# Patient Record
Sex: MALE | Race: WHITE | NOT HISPANIC OR LATINO | Employment: UNEMPLOYED | URBAN - METROPOLITAN AREA
[De-identification: names, ages, dates, MRNs, and addresses within clinical notes are randomized per-mention and may not be internally consistent; named-entity substitution may affect disease eponyms.]

---

## 2018-01-18 ENCOUNTER — HOSPITAL ENCOUNTER (EMERGENCY)
Facility: CLINIC | Age: 28
Discharge: HOME | End: 2018-01-18
Attending: NURSE PRACTITIONER | Admitting: NURSE PRACTITIONER
Payer: COMMERCIAL

## 2018-01-18 VITALS
DIASTOLIC BLOOD PRESSURE: 75 MMHG | HEART RATE: 60 BPM | SYSTOLIC BLOOD PRESSURE: 131 MMHG | TEMPERATURE: 98 F | OXYGEN SATURATION: 98 % | RESPIRATION RATE: 16 BRPM

## 2018-01-18 DIAGNOSIS — L20.9 ATOPIC DERMATITIS, UNSPECIFIED TYPE: ICD-10-CM

## 2018-01-18 DIAGNOSIS — L03.113 CELLULITIS OF RIGHT UPPER EXTREMITY: ICD-10-CM

## 2018-01-18 PROCEDURE — 99213 OFFICE O/P EST LOW 20 MIN: CPT | Performed by: NURSE PRACTITIONER

## 2018-01-18 PROCEDURE — G0463 HOSPITAL OUTPT CLINIC VISIT: HCPCS

## 2018-01-18 RX ORDER — BETAMETHASONE DIPROPIONATE 0.5 MG/G
OINTMENT, AUGMENTED TOPICAL
Qty: 45 G | Refills: 0 | Status: SHIPPED | OUTPATIENT
Start: 2018-01-18 | End: 2021-12-13

## 2018-01-18 RX ORDER — CEPHALEXIN 500 MG/1
500 CAPSULE ORAL 3 TIMES DAILY
Qty: 30 CAPSULE | Refills: 0 | Status: SHIPPED | OUTPATIENT
Start: 2018-01-18 | End: 2018-01-28

## 2018-01-18 ASSESSMENT — ENCOUNTER SYMPTOMS
VOMITING: 0
DIFFICULTY URINATING: 0
CONSTIPATION: 0
SHORTNESS OF BREATH: 0
FEVER: 0
ACTIVITY CHANGE: 0
ABDOMINAL PAIN: 0
SORE THROAT: 0
EYE PAIN: 0
CHILLS: 0
COUGH: 0
APPETITE CHANGE: 0
NAUSEA: 0
DIARRHEA: 0
DIAPHORESIS: 0

## 2018-01-18 NOTE — ED AVS SNAPSHOT
[facility] Emergency Department    [address] [address]    Phone:  [phone]    Fax:  [phone]                                       Berny Fowler   MRN: [id number]    Department:  [facility] Emergency Department   Date of Visit:  1/18/2018           After Visit Summary Signature Page     I have received my discharge instructions, and my questions have been answered. I have discussed any challenges I see with this plan with the nurse or doctor.    ..........................................................................................................................................  Patient/Patient Representative Signature      ..........................................................................................................................................  Patient Representative Print Name and Relationship to Patient    ..................................................               ................................................  Date                                            Time    ..........................................................................................................................................  Reviewed by Signature/Title    ...................................................              ..............................................  Date                                                            Time

## 2018-01-18 NOTE — ED AVS SNAPSHOT
[facility] Emergency Department    [address] [facility] [address]    Phone:  [phone]    Fax:  [phone]                                       Berny Fowler   MRN: [id number]    Department:  [facility] Emergency Department   Date of Visit:  1/18/2018           Patient Information     Date Of Birth          1990        Your diagnoses for this visit were:     Cellulitis of right upper extremity     Atopic dermatitis, unspecified type        You were seen by Mónica Black APRN CNP.      Follow-up Information     Follow up with Clinic, [facility] In 4 days.    Why:  For wound re-check    Contact information:    [address] [facility] [facility] [address] [phone]          Discharge Instructions           Discharge Instructions for Cellulitis  You have been diagnosed with cellulitis. This is an infection in the deepest layer of the skin. In some cases, the infection also affects the muscle. Cellulitis is caused by bacteria. The bacteria can enter the body through broken skin. This can happen with a cut, scratch, animal bite, or an insect bite that has been scratched. You may have been treated in the hospital with antibiotics and fluids. You will likely be given a prescription for antibiotics to take at home. This sheet will help you take care of yourself at home.  Home care  When you are home:    Take the prescribed antibiotic medicine you are given as directed until it is gone. Take it even if you feel better. It treats the infection and stops it from returning. Not taking all the medicine can make future infections hard to treat.    Keep the infected area clean.    When possible, raise the infected area above the level of your heart. This helps keep swelling down.    Talk with your healthcare provider if you are in pain. Ask what kind of over-the-counter medicine you can take for pain.    Apply clean bandages as advised.    Take your temperature once a day for a  week.    Wash your hands often to prevent spreading the infection.  In the future, wash your hands before and after you touch cuts, scratches, or bandages. This will help prevent infection.   When to call your healthcare provider  Call your healthcare provider immediately if you have any of the following:    Difficulty or pain when moving the joints above or below the infected area    Discharge or pus draining from the area    Fever of 100.4 F (38 C) or higher, or as directed by your healthcare provider    Pain that gets worse in or around the infected     Redness that gets worse in or around the infected area, particularly if the area of redness expands to a wider area    Shaking chills    Swelling of the infected area    Vomiting   Date Last Reviewed: 8/1/2016 2000-2017 [employer]. [address] [place] [address], [place] [address]. All rights reserved. This information is not intended as a substitute for professional medical care. Always follow your healthcare professional's instructions.        Atopic Dermatitis (Adult)  Atopic dermatitis is a dry, itchy, red rash. It s also called eczema. The rash is chronic, or ongoing. It can come and go over time. The disease is often passed down in families. It causes a problem with the skin barrier that makes the skin more sensitive to the environment and other factors. The increased skin sensitivity causes an itch, which causes scratching. Scratching can worsen the itching or also break the skin. This can put the skin at risk of infection.  The condition is most common in people with asthma, hay fever, hives, or dry or sensitive skin. The rash may be caused by extreme heat or heavy sweating. Skin irritants can cause the rash to flare up. These can include wool or silk clothing, grease, oils, some medicines, and harsh soaps and detergents. Emotional stress can also be a trigger.  Treatment is done to relieve the itching and inflammation of the skin.  Home  care  Follow these tips to care for your condition:    Keep the areas of rash clean by bathing at least every other day. Use lukewarm water to bathe. Don t use hot water, which can dry out the skin.    Don t use soaps with strong detergents. Use mild soaps made for sensitive skin.    Apply a cream or ointment to damp skin right after bathing.    Avoid things that irritate your skin. Wear absorbent, soft fabrics next to the skin rather than rough or scratchy materials.    Use mild laundry soap free of scents and perfumes. Make sure to rinse all the soap out of your clothes.    Treat any skin infection as directed.    Use oral diphenhydramine to help reduce itching. This is an antihistamine you can buy at drug and grocery stores. It can make you sleepy, so use lower doses during the daytime. Or you can use loratadine. This is an antihistamine that will not make you sleepy. Do not use diphenhydramine if you have glaucoma or have trouble urinating due to an enlarged prostate.  Follow-up care  See your healthcare provider, or as advised. If your symptoms don t get better or if they get worse in the next 7 days, make an appointment with your healthcare provider.  When to seek medical advice  Call your healthcare provider right away  if any of these occur:    Increasing area of redness or pain in the skin    Yellow crusts or wet drainage from the rash    Fever of 100.4 F (38 C) or higher, or as directed by your healthcare provider  Date Last Reviewed: 9/1/2016 2000-2017 [employer]. [address] [place] [address]. All rights reserved. This information is not intended as a substitute for professional medical care. Always follow your healthcare professional's instructions.          24 Hour Appointment Hotline       To make an appointment at any [facility], call [phone] ([phone]). If you don't have a family doctor or clinic, we will help you find one. [facility] are  conveniently located to serve the needs of you and your family.             Review of your medicines      START taking        Dose / Directions Last dose taken    augmented betamethasone dipropionate 0.05 % ointment   Commonly known as:  DIPROLENE-AF   Quantity:  45 g        Apply sparingly to affected area twice daily as needed.  Do not apply to face.   Refills:  0        cephALEXin 500 MG capsule   Commonly known as:  KEFLEX   Dose:  500 mg   Quantity:  30 capsule        Take 1 capsule (500 mg) by mouth 3 times daily for 10 days   Refills:  0                Prescriptions were sent or printed at these locations (2 Prescriptions)                   [facility] [place], [place] - [address] [facility]   [address] [facility] [address]    Telephone:  [phone]   Fax:  [phone]   Hours:                  E-Prescribed (2 of 2)         augmented betamethasone dipropionate (DIPROLENE-AF) 0.05 % ointment               cephALEXin (KEFLEX) 500 MG capsule                Orders Needing Specimen Collection     None      Pending Results     No orders found from 1/16/2018 to 1/19/2018.            Pending Culture Results     No orders found from 1/16/2018 to 1/19/2018.            Pending Results Instructions     If you had any lab results that were not finalized at the time of your Discharge, you can call the ED Lab Result RN at [phone]. You will be contacted by this team for any positive Lab results or changes in treatment. The nurses are available 7 days a week from 10A to 6:30P.  You can leave a message 24 hours per day and they will return your call.        Test Results From Your Hospital Stay               Thank you for choosing [facility]       Thank you for choosing [facility] for your care. Our goal is always to provide you with excellent care. Hearing back from our patients is one way we can continue to improve our services. Please take a few minutes to complete the written survey that you may receive  "in the mail after you visit with us. Thank you!        [employer] Information     [employer] lets you send messages to your doctor, view your test results, renew your prescriptions, schedule appointments and more. To sign up, go to [email or web address] . Click on \"Log in\" on the left side of the screen, which will take you to the Welcome page. Then click on \"Sign up Now\" on the right side of the page.     You will be asked to enter the access code listed below, as well as some personal information. Please follow the directions to create your username and password.     Your access code is: [email or web address]  Expires: 2018  6:21 PM     Your access code will  in 90 days. If you need help or a new code, please call your [facility] clinic or [phone].        Care EveryWhere ID     This is your Care EveryWhere ID. This could be used by other organizations to access your [facility] medical records  [id number]        Equal Access to Services     [facility] : Hadii eliud chingo Sotrisha, waaxda luqadaha, qaybta kaalmada adeegyaashley, sagrario franco . So [facility] [phone].    ATENCIÓN: Si habla español, tiene a mccabe disposición servicios gratuitos de asistencia lingüística. Llame al [phone].    We comply with applicable federal civil rights laws and [place] laws. We do not discriminate on the basis of race, color, national origin, age, disability, sex, sexual orientation, or gender identity.            After Visit Summary       This is your record. Keep this with you and show to your community pharmacist(s) and doctor(s) at your next visit.                  "

## 2018-01-18 NOTE — ED NOTES
Patient here for rash on the right hand - since October - worsening the past 12 days.  Patient presents ambulatory to the urgent care.

## 2018-01-18 NOTE — ED PROVIDER NOTES
History     Chief Complaint   Patient presents with     Rash     on R hand     HPI  Berny Fowler is a 27 year old male who states started in October and over the past two weeks has been getting markedly worse.  It started as quarter size on top of hand near third knuckle.  Pt states it was a dry red area that itched and burned when washing hands.  Pt states over the past two weeks it has also started oozing and spread all over the top of the hand.  Tried mometasone cream once daily occasionally and this did seem to help.      Problem List:    Patient Active Problem List    Diagnosis Date Noted     Impacted cerumen 09/26/2012     Priority: Medium     Scabies 09/26/2012     Priority: Medium     Acne 08/04/2011     Priority: Medium     Flat feet 08/04/2011     Priority: Medium     CARDIOVASCULAR SCREENING; LDL GOAL LESS THAN 160 09/26/2012     Priority: Low        Past Medical History:    Past Medical History:   Diagnosis Date     Undescended testis        Past Surgical History:    Past Surgical History:   Procedure Laterality Date     NO HISTORY OF SURGERY         Family History:    Family History   Problem Relation Age of Onset     C.A.D. Father      MI at age 42 with stent placement     Hypertension Father      Lipids Father      C.A.D. Maternal Grandfather      C.A.D. Paternal Grandfather      Allergies Sister      Melanoma No family hx of        Social History:  Marital Status:  Single [1]  Social History   Substance Use Topics     Smoking status: Never Smoker     Smokeless tobacco: Never Used     Alcohol use No      Medications:      augmented betamethasone dipropionate (DIPROLENE-AF) 0.05 % ointment   cephALEXin (KEFLEX) 500 MG capsule         Review of Systems   Constitutional: Negative for activity change, appetite change, chills, diaphoresis and fever.   HENT: Negative for ear pain and sore throat.    Eyes: Negative for pain.   Respiratory: Negative for cough and shortness of breath.    Cardiovascular:  Negative for chest pain.   Gastrointestinal: Negative for abdominal pain, constipation, diarrhea, nausea and vomiting.   Genitourinary: Negative for difficulty urinating.   Skin: Positive for rash.   All other systems reviewed and are negative.      Physical Exam   BP: 131/75  Pulse: 60  Temp: 98  F (36.7  C)  Resp: 16  SpO2: 98 %      Physical Exam   Constitutional: He appears well-developed and well-nourished. No distress.   Eyes: Conjunctivae are normal. Right eye exhibits no discharge. Left eye exhibits no discharge.   Cardiovascular: Normal rate, regular rhythm and normal heart sounds.  Exam reveals no gallop and no friction rub.    No murmur heard.  Pulmonary/Chest: Effort normal and breath sounds normal. No respiratory distress. He has no wheezes. He has no rales. He exhibits no tenderness.   Neurological: He is alert.   Skin: Skin is warm. Lesion and rash (noted over dorsum of right hand in a scattered pattern.  The lesions vary in size from 3 mm to 10 mm and are raised macular with surrounding erythema, scales, and crusty drainage noted in some) noted. He is not diaphoretic.   Psychiatric: He has a normal mood and affect.   Nursing note and vitals reviewed.      ED Course     ED Course     Procedures      Labs Ordered and Resulted from Time of ED Arrival Up to the Time of Departure from the ED - No data to display    Assessments & Plan (with Medical Decision Making)     I have reviewed the nursing notes.    I have reviewed the findings, diagnosis, plan and need for follow up with the patient.  Berny Fowler is a 27 year old male who states started in October and over the past two weeks has been getting markedly worse.  It started as quarter size on top of hand near third knuckle.  Pt states it was a dry red area that itched and burned when washing hands.  Pt states over the past two weeks it has also started oozing and spread all over the top of the hand.  Tried mometasone cream once daily occasionally  and this did seem to help.  DDx: contact or allergic dermatitis, drug reaction, viral exanthem, insect bite, cellulitis, atopic dermatitis, psoriasis, infected atopic dermatitis.  Exam as noted above.  Based upon mild responsiveness to occasional mometasone and based upon appearance it is likely the patient started out as having an atopic dermatitis and based upon the erythema it appears that there may be any emerging cellulitis noted.  Will treat with cephalexin and augmented betamethasone.  Strongly advised patient on usage of both and need for follow-up.  Patient verbalizes understanding and denies any questions.    Discharge Medication List as of 1/18/2018  6:22 PM      START taking these medications    Details   augmented betamethasone dipropionate (DIPROLENE-AF) 0.05 % ointment Apply sparingly to affected area twice daily as needed.  Do not apply to face.Disp-45 g, [id number]      cephALEXin (KEFLEX) 500 MG capsule Take 1 capsule (500 mg) by mouth 3 times daily for 10 days, Disp-30 capsule, R-0, E-Prescribe             Final diagnoses:   Cellulitis of right upper extremity   Atopic dermatitis, unspecified type       1/18/2018   [facility] EMERGENCY DEPARTMENT     Mónica Black, KALINA CNP  01/18/18 0656

## 2018-01-19 NOTE — DISCHARGE INSTRUCTIONS
Discharge Instructions for Cellulitis  You have been diagnosed with cellulitis. This is an infection in the deepest layer of the skin. In some cases, the infection also affects the muscle. Cellulitis is caused by bacteria. The bacteria can enter the body through broken skin. This can happen with a cut, scratch, animal bite, or an insect bite that has been scratched. You may have been treated in the hospital with antibiotics and fluids. You will likely be given a prescription for antibiotics to take at home. This sheet will help you take care of yourself at home.  Home care  When you are home:    Take the prescribed antibiotic medicine you are given as directed until it is gone. Take it even if you feel better. It treats the infection and stops it from returning. Not taking all the medicine can make future infections hard to treat.    Keep the infected area clean.    When possible, raise the infected area above the level of your heart. This helps keep swelling down.    Talk with your healthcare provider if you are in pain. Ask what kind of over-the-counter medicine you can take for pain.    Apply clean bandages as advised.    Take your temperature once a day for a week.    Wash your hands often to prevent spreading the infection.  In the future, wash your hands before and after you touch cuts, scratches, or bandages. This will help prevent infection.   When to call your healthcare provider  Call your healthcare provider immediately if you have any of the following:    Difficulty or pain when moving the joints above or below the infected area    Discharge or pus draining from the area    Fever of 100.4 F (38 C) or higher, or as directed by your healthcare provider    Pain that gets worse in or around the infected     Redness that gets worse in or around the infected area, particularly if the area of redness expands to a wider area    Shaking chills    Swelling of the infected area    Vomiting   Date Last Reviewed:  8/1/2016 2000-2017 [employer]. [address] [place] [address], [place] [address]. All rights reserved. This information is not intended as a substitute for professional medical care. Always follow your healthcare professional's instructions.        Atopic Dermatitis (Adult)  Atopic dermatitis is a dry, itchy, red rash. It s also called eczema. The rash is chronic, or ongoing. It can come and go over time. The disease is often passed down in families. It causes a problem with the skin barrier that makes the skin more sensitive to the environment and other factors. The increased skin sensitivity causes an itch, which causes scratching. Scratching can worsen the itching or also break the skin. This can put the skin at risk of infection.  The condition is most common in people with asthma, hay fever, hives, or dry or sensitive skin. The rash may be caused by extreme heat or heavy sweating. Skin irritants can cause the rash to flare up. These can include wool or silk clothing, grease, oils, some medicines, and harsh soaps and detergents. Emotional stress can also be a trigger.  Treatment is done to relieve the itching and inflammation of the skin.  Home care  Follow these tips to care for your condition:    Keep the areas of rash clean by bathing at least every other day. Use lukewarm water to bathe. Don t use hot water, which can dry out the skin.    Don t use soaps with strong detergents. Use mild soaps made for sensitive skin.    Apply a cream or ointment to damp skin right after bathing.    Avoid things that irritate your skin. Wear absorbent, soft fabrics next to the skin rather than rough or scratchy materials.    Use mild laundry soap free of scents and perfumes. Make sure to rinse all the soap out of your clothes.    Treat any skin infection as directed.    Use oral diphenhydramine to help reduce itching. This is an antihistamine you can buy at drug and grocery stores. It can make you sleepy, so use  lower doses during the daytime. Or you can use loratadine. This is an antihistamine that will not make you sleepy. Do not use diphenhydramine if you have glaucoma or have trouble urinating due to an enlarged prostate.  Follow-up care  See your healthcare provider, or as advised. If your symptoms don t get better or if they get worse in the next 7 days, make an appointment with your healthcare provider.  When to seek medical advice  Call your healthcare provider right away  if any of these occur:    Increasing area of redness or pain in the skin    Yellow crusts or wet drainage from the rash    Fever of 100.4 F (38 C) or higher, or as directed by your healthcare provider  Date Last Reviewed: 9/1/2016 2000-2017 [employer]. [address] [place] [address], [place] [address]. All rights reserved. This information is not intended as a substitute for professional medical care. Always follow your healthcare professional's instructions.

## 2018-10-09 ENCOUNTER — OFFICE VISIT (OUTPATIENT)
Dept: DERMATOLOGY | Facility: CLINIC | Age: 28
End: 2018-10-09
Payer: COMMERCIAL

## 2018-10-09 VITALS — OXYGEN SATURATION: 99 % | DIASTOLIC BLOOD PRESSURE: 68 MMHG | SYSTOLIC BLOOD PRESSURE: 112 MMHG | HEART RATE: 67 BPM

## 2018-10-09 DIAGNOSIS — L20.81 ATOPIC NEURODERMATITIS: Primary | ICD-10-CM

## 2018-10-09 PROCEDURE — 99203 OFFICE O/P NEW LOW 30 MIN: CPT | Performed by: DERMATOLOGY

## 2018-10-09 RX ORDER — DESONIDE 0.5 MG/G
CREAM TOPICAL
Qty: 60 G | Refills: 1 | Status: SHIPPED | OUTPATIENT
Start: 2018-10-09 | End: 2021-12-13

## 2018-10-09 NOTE — MR AVS SNAPSHOT
After Visit Summary   10/9/2018    Berny Fowler    MRN: [id number]           Patient Information     Date Of Birth          1990        Visit Information        Provider Department      10/9/2018 1:30 PM Junito Carson MD [facility]        Today's Diagnoses     Atopic neurodermatitis    -  1      Care Instructions    **Start applying the prescription cream to affected areas.   **Start taking an over the counter Zyrtec at bedtime  **Follow up with  in 2 weeks    Proper skin care from Dr. Carson- [facility]     Eliminate harsh soaps, i.e. Dial, Zest, Shamika Spring;   Use mild soaps such as Cetaphil or Dove Sensitive Skin   Avoid hot or cold showers   After showering, lightly dry off.    Aggressive use of a moisturizer (including Vanicream, Cetaphil, Aquaphor or Cerave)   We recommend using a tub that needs to be scooped out, not a pump. This has more of an oil base. It will hold moisture in your skin much better than a water base moisturizer. The ones recommended are non- pore clogging.       If you have any questions call [phone] and follow the prompts to Dr. Carson's office.           Follow-ups after your visit        Who to contact     If you have questions or need follow up information about today's clinic visit or your schedule please contact [facility] directly at [phone].  Normal or non-critical lab and imaging results will be communicated to you by MyChart, letter or phone within 4 business days after the clinic has received the results. If you do not hear from us within 7 days, please contact the clinic through MyChart or phone. If you have a critical or abnormal lab result, we will notify you by phone as soon as possible.  Submit refill requests through [employer] or call your pharmacy and they will forward the refill request to us. Please allow 3 business days for your refill to be completed.          Additional  Information About Your Visit        Care EveryWhere ID     This is your Care EveryWhere ID. This could be used by other organizations to access your [place] medical records  [id number]        Your Vitals Were     Pulse Pulse Oximetry                67 99%           Blood Pressure from Last 3 Encounters:   10/09/18 112/68   01/18/18 131/75   06/02/15 118/85    Weight from Last 3 Encounters:   11/17/14 59 kg (130 lb)   08/25/14 59 kg (130 lb)   08/12/14 59 kg (130 lb)              Today, you had the following     No orders found for display         Today's Medication Changes          These changes are accurate as of 10/9/18  1:45 PM.  If you have any questions, ask your nurse or doctor.               Start taking these medicines.        Dose/Directions    desonide 0.05 % cream   Commonly known as:  DESOWEN   Used for:  Atopic neurodermatitis   Started by:  Junito Carson MD        Apply sparingly to affected area twice dailytimes daily as needed.   Quantity:  60 g   Refills:  1            Where to get your medicines      These medications were sent to [facility] [address] [place] [address]  [address] [place] [address]     Phone:  [phone]     desonide 0.05 % cream                Primary Care Provider Office Phone # Fax #    [facility] [phone] [address] [facility] [address]        Equal Access to Services     BÁRBARA REYES : Hadale chingo Sotrisha, waaxda luqadaha, qaybta kaalmada shakir, sagrario miller. So [facility] [phone].    ATENCIÓN: Si habla español, tiene a mccabe disposición servicios gratuitos de asistencia lingüística. Sage al [phone].    We comply with applicable federal civil rights laws and [place] laws. We do not discriminate on the basis of race, color, national origin, age, disability, sex, sexual orientation, or gender identity.            Thank you!     Thank you for  choosing [facility]  for your care. Our goal is always to provide you with excellent care. Hearing back from our patients is one way we can continue to improve our services. Please take a few minutes to complete the written survey that you may receive in the mail after your visit with us. Thank you!             Your Updated Medication List - Protect others around you: Learn how to safely use, store and throw away your medicines at www.disposemymeds.org.          This list is accurate as of 10/9/18  1:45 PM.  Always use your most recent med list.                   Brand Name Dispense Instructions for use Diagnosis    augmented betamethasone dipropionate 0.05 % ointment    DIPROLENE-AF    45 g    Apply sparingly to affected area twice daily as needed.  Do not apply to face.        desonide 0.05 % cream    DESOWEN    60 g    Apply sparingly to affected area twice dailytimes daily as needed.    Atopic neurodermatitis

## 2018-10-09 NOTE — PATIENT INSTRUCTIONS
**Start applying the prescription cream to affected areas.   **Start taking an over the counter Zyrtec at bedtime  **Follow up with  in 2 weeks    Proper skin care from Dr. Carson- [facility]     Eliminate harsh soaps, i.e. Dial, Zest, Shamika Spring;   Use mild soaps such as Cetaphil or Dove Sensitive Skin   Avoid hot or cold showers   After showering, lightly dry off.    Aggressive use of a moisturizer (including Vanicream, Cetaphil, Aquaphor or Cerave)   We recommend using a tub that needs to be scooped out, not a pump. This has more of an oil base. It will hold moisture in your skin much better than a water base moisturizer. The ones recommended are non- pore clogging.       If you have any questions call [phone] and follow the prompts to Dr. Carson's office.

## 2018-10-09 NOTE — NURSING NOTE
Chief Complaint   Patient presents with     Psoriasis       Vitals:    10/09/18 1335   BP: 112/68   Pulse: 67   SpO2: 99%     Wt Readings from Last 1 Encounters:   11/17/14 59 kg (130 lb)       Rachel Barrow LPN.................10/9/2018

## 2018-10-09 NOTE — PROGRESS NOTES
Berny Fowler is a 28 year old year old male patient here today for rash on face.   .  Patient states this has been present for 1 year.  Patient reports the following symptoms:  Itcihng/burn .  Patient reports the following previous treatments none.  Patient reports the following modifying factors none.  Associated symptoms: none.  Patient has no other skin complaints today.  Remainder of the HPI, Meds, PMH, Allergies, FH, and SH was reviewed in chart.      Past Medical History:   Diagnosis Date     Undescended testis     CAME DOWN WITHOUT SURGERY       Past Surgical History:   Procedure Laterality Date     NO HISTORY OF SURGERY          Family History   Problem Relation Age of Onset     C.A.D. Father      MI at age 42 with stent placement     Hypertension Father      Lipids Father      C.A.D. Maternal Grandfather      C.A.D. Paternal Grandfather      Allergies Sister      Melanoma No family hx of        Social History     Social History     Marital status: Single     Spouse name: N/A     Number of children: N/A     Years of education: N/A     Occupational History      [employer]     Social History Main Topics     Smoking status: Never Smoker     Smokeless tobacco: Never Used     Alcohol use No     Drug use: No     Sexual activity: No     Other Topics Concern     Parent/Sibling W/ Cabg, Mi Or Angioplasty Before 65f 55m? Yes     father Mi's before the age of 55     Social History Narrative       Outpatient Encounter Prescriptions as of 10/9/2018   Medication Sig Dispense Refill     augmented betamethasone dipropionate (DIPROLENE-AF) 0.05 % ointment Apply sparingly to affected area twice daily as needed.  Do not apply to face. 45 g 0     No facility-administered encounter medications on file as of 10/9/2018.              Review Of Systems  Skin: As above  Eyes: negative  Ears/Nose/Throat: negative  Respiratory: No shortness of breath, dyspnea on exertion, cough, or hemoptysis  Cardiovascular:  negative  Gastrointestinal: negative  Genitourinary: negative  Musculoskeletal: negative  Neurologic: negative  Psychiatric: negative  Hematologic/Lymphatic/Immunologic: negative  Endocrine: negative      O:   NAD, WDWN, Alert & Oriented, Mood & Affect wnl, Vitals stable   Here today alone   /68  Pulse 67  SpO2 99%   General appearance normal   Vitals stable   Alert, oriented and in no acute distress      Following lymph nodes palpated: Occipital, Cervical, Supraclavicular no lad   Red eczmeatous patches on face with fine white scale       No nail pitting    The remainder of expanded problem focused exam was unremarkable; the following areas were examined:  scalp/hair, conjunctiva/lids, face, neck, lips, chest, digits/nails, RUE, LUE.      Eyes: Conjunctivae/lids:Normal     ENT: Lips, buccal mucosa, tongue: normal    MSK:Normal    Cardiovascular: peripheral edema none    Pulm: Breathing Normal    Neuro/Psych: Orientation:Normal; Mood/Affect:Normal      A/P:  1. Eczema  Zyrtec bedtime  Desonide twice daily  Skin care regimen reviewed with patient: Eliminate harsh soaps, i.e. Dial, zest, irsih spring; Mild soaps such as Cetaphil or Dove sensitive skin, avoid hot or cold showers, aggressive use of emollients including vanicream, cetaphil or cerave discussed with patient.    Return to clinic 2 weeks

## 2018-10-09 NOTE — LETTER
10/9/2018         RE: Berny Fowler  [address]  [place] [address]        Dear Colleague,    Thank you for referring your patient, Berny Fowler, to the [facility]. Please see a copy of my visit note below.    Breny Folwer is a 28 year old year old male patient here today for rash on face.   .  Patient states this has been present for 1 year.  Patient reports the following symptoms:  Itcihng/burn .  Patient reports the following previous treatments none.  Patient reports the following modifying factors none.  Associated symptoms: none.  Patient has no other skin complaints today.  Remainder of the HPI, Meds, PMH, Allergies, FH, and SH was reviewed in chart.      Past Medical History:   Diagnosis Date     Undescended testis     CAME DOWN WITHOUT SURGERY       Past Surgical History:   Procedure Laterality Date     NO HISTORY OF SURGERY          Family History   Problem Relation Age of Onset     C.A.D. Father      MI at age 42 with stent placement     Hypertension Father      Lipids Father      C.A.D. Maternal Grandfather      C.A.D. Paternal Grandfather      Allergies Sister      Melanoma No family hx of        Social History     Social History     Marital status: Single     Spouse name: N/A     Number of children: N/A     Years of education: N/A     Occupational History      [employer]     Social History Main Topics     Smoking status: Never Smoker     Smokeless tobacco: Never Used     Alcohol use No     Drug use: No     Sexual activity: No     Other Topics Concern     Parent/Sibling W/ Cabg, Mi Or Angioplasty Before 65f 55m? Yes     father Mi's before the age of 55     Social History Narrative       Outpatient Encounter Prescriptions as of 10/9/2018   Medication Sig Dispense Refill     augmented betamethasone dipropionate (DIPROLENE-AF) 0.05 % ointment Apply sparingly to affected area twice daily as needed.  Do not apply to face. 45 g 0     No facility-administered encounter  medications on file as of 10/9/2018.              Review Of Systems  Skin: As above  Eyes: negative  Ears/Nose/Throat: negative  Respiratory: No shortness of breath, dyspnea on exertion, cough, or hemoptysis  Cardiovascular: negative  Gastrointestinal: negative  Genitourinary: negative  Musculoskeletal: negative  Neurologic: negative  Psychiatric: negative  Hematologic/Lymphatic/Immunologic: negative  Endocrine: negative      O:   NAD, WDWN, Alert & Oriented, Mood & Affect wnl, Vitals stable   Here today alone   /68  Pulse 67  SpO2 99%   General appearance normal   Vitals stable   Alert, oriented and in no acute distress      Following lymph nodes palpated: Occipital, Cervical, Supraclavicular no lad   Red eczmeatous patches on face with fine white scale       No nail pitting    The remainder of expanded problem focused exam was unremarkable; the following areas were examined:  scalp/hair, conjunctiva/lids, face, neck, lips, chest, digits/nails, RUE, LUE.      Eyes: Conjunctivae/lids:Normal     ENT: Lips, buccal mucosa, tongue: normal    MSK:Normal    Cardiovascular: peripheral edema none    Pulm: Breathing Normal    Neuro/Psych: Orientation:Normal; Mood/Affect:Normal      A/P:  1. Eczema  Zyrtec bedtime  Desonide twice daily  Skin care regimen reviewed with patient: Eliminate harsh soaps, i.e. Dial, zest, irsih spring; Mild soaps such as Cetaphil or Dove sensitive skin, avoid hot or cold showers, aggressive use of emollients including vanicream, cetaphil or cerave discussed with patient.    Return to clinic 2 weeks      Again, thank you for allowing me to participate in the care of your patient.        Sincerely,        Junito Carson MD

## 2018-10-23 ENCOUNTER — OFFICE VISIT (OUTPATIENT)
Dept: DERMATOLOGY | Facility: CLINIC | Age: 28
End: 2018-10-23
Payer: COMMERCIAL

## 2018-10-23 VITALS — DIASTOLIC BLOOD PRESSURE: 70 MMHG | HEART RATE: 68 BPM | OXYGEN SATURATION: 98 % | SYSTOLIC BLOOD PRESSURE: 109 MMHG

## 2018-10-23 DIAGNOSIS — L20.81 ATOPIC NEURODERMATITIS: Primary | ICD-10-CM

## 2018-10-23 PROCEDURE — 99213 OFFICE O/P EST LOW 20 MIN: CPT | Performed by: DERMATOLOGY

## 2018-10-23 RX ORDER — CETIRIZINE HYDROCHLORIDE 10 MG/1
10 TABLET ORAL DAILY
COMMUNITY
End: 2021-12-13

## 2018-10-23 NOTE — MR AVS SNAPSHOT
After Visit Summary   10/23/2018    Berny Fowler    MRN: [id number]           Patient Information     Date Of Birth          1990        Visit Information        Provider Department      10/23/2018 10:30 AM Junito Carson MD [facility]        Today's Diagnoses     Atopic neurodermatitis    -  1       Follow-ups after your visit        Who to contact     If you have questions or need follow up information about today's clinic visit or your schedule please contact [facility] directly at [phone].  Normal or non-critical lab and imaging results will be communicated to you by MyChart, letter or phone within 4 business days after the clinic has received the results. If you do not hear from us within 7 days, please contact the clinic through MyChart or phone. If you have a critical or abnormal lab result, we will notify you by phone as soon as possible.  Submit refill requests through [employer] or call your pharmacy and they will forward the refill request to us. Please allow 3 business days for your refill to be completed.          Additional Information About Your Visit        Care EveryWhere ID     This is your Care EveryWhere ID. This could be used by other organizations to access your [place] medical records  [id number]        Your Vitals Were     Pulse Pulse Oximetry                68 98%           Blood Pressure from Last 3 Encounters:   10/23/18 109/70   10/09/18 112/68   01/18/18 131/75    Weight from Last 3 Encounters:   11/17/14 59 kg (130 lb)   08/25/14 59 kg (130 lb)   08/12/14 59 kg (130 lb)              Today, you had the following     No orders found for display       Primary Care Provider Office Phone # Fax #    [facility] [phone] [address] [facility] [address]        Equal Access to Services     BÁRBARA REYES : gato Jama qaybta kaalmada adeegyada, waxay  erlinda whitneyadolfo la'aan ah. So [facility] [phone].    ATENCIÓN: Si habla giovanna, tiene a mccabe disposición servicios gratuitos de asistencia lingüística. Sage al [phone].    We comply with applicable federal civil rights laws and [place] laws. We do not discriminate on the basis of race, color, national origin, age, disability, sex, sexual orientation, or gender identity.            Thank you!     Thank you for choosing [facility]  for your care. Our goal is always to provide you with excellent care. Hearing back from our patients is one way we can continue to improve our services. Please take a few minutes to complete the written survey that you may receive in the mail after your visit with us. Thank you!             Your Updated Medication List - Protect others around you: Learn how to safely use, store and throw away your medicines at www.disposemymeds.org.          This list is accurate as of 10/23/18 12:03 PM.  Always use your most recent med list.                   Brand Name Dispense Instructions for use Diagnosis    augmented betamethasone dipropionate 0.05 % ointment    DIPROLENE-AF    45 g    Apply sparingly to affected area twice daily as needed.  Do not apply to face.        cetirizine 10 MG tablet    zyrTEC     Take 10 mg by mouth daily        desonide 0.05 % cream    DESOWEN    60 g    Apply sparingly to affected area twice dailytimes daily as needed.    Atopic neurodermatitis

## 2018-10-23 NOTE — LETTER
10/23/2018         RE: Berny Fowler  [address]  [place] [address]        Dear Colleague,    Thank you for referring your patient, Berny Fowler, to the [facility]. Please see a copy of my visit note below.    Berny Fowler is a 28 year old year old male patient here today for f/u atopic derm,  roderick teresa and zyrtec, no issues, happy. Associated symptoms: none.  Patient has no other skin complaints today.  Remainder of the HPI, Meds, PMH, Allergies, FH, and SH was reviewed in chart.      Past Medical History:   Diagnosis Date     Undescended testis     CAME DOWN WITHOUT SURGERY       Past Surgical History:   Procedure Laterality Date     NO HISTORY OF SURGERY          Family History   Problem Relation Age of Onset     C.A.D. Father      MI at age 42 with stent placement     Hypertension Father      Lipids Father      C.A.D. Maternal Grandfather      C.A.D. Paternal Grandfather      Allergies Sister      Melanoma No family hx of        Social History     Social History     Marital status: Single     Spouse name: N/A     Number of children: N/A     Years of education: N/A     Occupational History      [employer]     Social History Main Topics     Smoking status: Never Smoker     Smokeless tobacco: Never Used     Alcohol use No     Drug use: No     Sexual activity: No     Other Topics Concern     Parent/Sibling W/ Cabg, Mi Or Angioplasty Before 65f 55m? Yes     father Mi's before the age of 55     Social History Narrative       Outpatient Encounter Prescriptions as of 10/23/2018   Medication Sig Dispense Refill     cetirizine (ZYRTEC) 10 MG tablet Take 10 mg by mouth daily       augmented betamethasone dipropionate (DIPROLENE-AF) 0.05 % ointment Apply sparingly to affected area twice daily as needed.  Do not apply to face. 45 g 0     desonide (DESOWEN) 0.05 % cream Apply sparingly to affected area twice dailytimes daily as needed. 60 g 1     No facility-administered  encounter medications on file as of 10/23/2018.              Review Of Systems  Skin: As above  Eyes: negative  Ears/Nose/Throat: negative  Respiratory: No shortness of breath, dyspnea on exertion, cough, or hemoptysis  Cardiovascular: negative  Gastrointestinal: negative  Genitourinary: negative  Musculoskeletal: negative  Neurologic: negative  Psychiatric: negative  Hematologic/Lymphatic/Immunologic: negative  Endocrine: negative      O:   NAD, WDWN, Alert & Oriented, Mood & Affect wnl, Vitals stable   Here today alone   /70  Pulse 68  SpO2 98%   General appearance normal   Vitals stable   Alert, oriented and in no acute distress     Faint erythema on face     The remainder of expanded problem focused exam was unremarkable; the following areas were examined:  scalp/hair, conjunctiva/lids, face, neck, lips      Eyes: Conjunctivae/lids:Normal     ENT: Lips, buccal mucosa, tongue: normal    MSK:Normal    Cardiovascular: peripheral edema none    Pulm: Breathing Normal    Neuro/Psych: Orientation:Normal; Mood/Affect:Normal      A/P:  1. Atopic derm  Barrier disease reviewed with patient  Skin care regimen reviewed with patient: Eliminate harsh soaps, i.e. Dial, zest, irsih spring; Mild soaps such as Cetaphil or Dove sensitive skin, avoid hot or cold showers, aggressive use of emollients including vanicream, cetaphil or cerave discussed with patient.    Return to clinic prn  Patient to follow up with Primary Care provider regarding elevated blood pressure.        Again, thank you for allowing me to participate in the care of your patient.        Sincerely,        Junito Carson MD

## 2018-10-23 NOTE — PROGRESS NOTES
Berny Fowler is a 28 year old year old male patient here today for f/u atopic derm,  roderick teresa and zyrtec, no issues, happy. Associated symptoms: none.  Patient has no other skin complaints today.  Remainder of the HPI, Meds, PMH, Allergies, FH, and SH was reviewed in chart.      Past Medical History:   Diagnosis Date     Undescended testis     CAME DOWN WITHOUT SURGERY       Past Surgical History:   Procedure Laterality Date     NO HISTORY OF SURGERY          Family History   Problem Relation Age of Onset     C.A.D. Father      MI at age 42 with stent placement     Hypertension Father      Lipids Father      C.A.D. Maternal Grandfather      C.A.D. Paternal Grandfather      Allergies Sister      Melanoma No family hx of        Social History     Social History     Marital status: Single     Spouse name: N/A     Number of children: N/A     Years of education: N/A     Occupational History      [employer]     Social History Main Topics     Smoking status: Never Smoker     Smokeless tobacco: Never Used     Alcohol use No     Drug use: No     Sexual activity: No     Other Topics Concern     Parent/Sibling W/ Cabg, Mi Or Angioplasty Before 65f 55m? Yes     father Mi's before the age of 55     Social History Narrative       Outpatient Encounter Prescriptions as of 10/23/2018   Medication Sig Dispense Refill     cetirizine (ZYRTEC) 10 MG tablet Take 10 mg by mouth daily       augmented betamethasone dipropionate (DIPROLENE-AF) 0.05 % ointment Apply sparingly to affected area twice daily as needed.  Do not apply to face. 45 g 0     desonide (DESOWEN) 0.05 % cream Apply sparingly to affected area twice dailytimes daily as needed. 60 g 1     No facility-administered encounter medications on file as of 10/23/2018.              Review Of Systems  Skin: As above  Eyes: negative  Ears/Nose/Throat: negative  Respiratory: No shortness of breath, dyspnea on exertion, cough, or hemoptysis  Cardiovascular:  negative  Gastrointestinal: negative  Genitourinary: negative  Musculoskeletal: negative  Neurologic: negative  Psychiatric: negative  Hematologic/Lymphatic/Immunologic: negative  Endocrine: negative      O:   NAD, WDWN, Alert & Oriented, Mood & Affect wnl, Vitals stable   Here today alone   /70  Pulse 68  SpO2 98%   General appearance normal   Vitals stable   Alert, oriented and in no acute distress     Faint erythema on face     The remainder of expanded problem focused exam was unremarkable; the following areas were examined:  scalp/hair, conjunctiva/lids, face, neck, lips      Eyes: Conjunctivae/lids:Normal     ENT: Lips, buccal mucosa, tongue: normal    MSK:Normal    Cardiovascular: peripheral edema none    Pulm: Breathing Normal    Neuro/Psych: Orientation:Normal; Mood/Affect:Normal      A/P:  1. Atopic derm  Barrier disease reviewed with patient  Skin care regimen reviewed with patient: Eliminate harsh soaps, i.e. Dial, zest, irsih spring; Mild soaps such as Cetaphil or Dove sensitive skin, avoid hot or cold showers, aggressive use of emollients including vanicream, cetaphil or cerave discussed with patient.    Return to clinic prn  Patient to follow up with Primary Care provider regarding elevated blood pressure.

## 2019-06-04 ENCOUNTER — HOSPITAL ENCOUNTER (EMERGENCY)
Facility: CLINIC | Age: 29
Discharge: HOME | End: 2019-06-04
Attending: NURSE PRACTITIONER | Admitting: NURSE PRACTITIONER
Payer: COMMERCIAL

## 2019-06-04 VITALS
OXYGEN SATURATION: 98 % | TEMPERATURE: 97.9 F | DIASTOLIC BLOOD PRESSURE: 77 MMHG | WEIGHT: 138 LBS | BODY MASS INDEX: 20.38 KG/M2 | SYSTOLIC BLOOD PRESSURE: 115 MMHG

## 2019-06-04 DIAGNOSIS — R68.84 JAW PAIN: ICD-10-CM

## 2019-06-04 PROCEDURE — 99213 OFFICE O/P EST LOW 20 MIN: CPT | Mod: Z6 | Performed by: NURSE PRACTITIONER

## 2019-06-04 PROCEDURE — G0463 HOSPITAL OUTPT CLINIC VISIT: HCPCS

## 2019-06-04 RX ORDER — CYCLOBENZAPRINE HCL 10 MG
10 TABLET ORAL
Qty: 7 TABLET | Refills: 0 | Status: SHIPPED | OUTPATIENT
Start: 2019-06-04 | End: 2021-12-13

## 2019-06-04 NOTE — DISCHARGE INSTRUCTIONS
Augmentin 875 mg twice daily for 10 days. (antibiotic)  Flexeril 10 mg at bedtime as needed for jaw stiffness (muscle relaxer).  Suck on sour candy.  Tylenol &/Or Ibuprofen as needed for pain.   Warm packs.  Return to the emergency department for fevers, increased swelling, redness, or increased pain.

## 2019-06-04 NOTE — ED PROVIDER NOTES
History     Chief Complaint   Patient presents with     Jaw Pain     lower jaw pain and swelling, hurts more when opening mouth     HPI  Berny Fowler is a 29 year old male who presents to urgent care for evaluation of left lower jaw pain and swelling.  Pain started abruptly at 2 AM in the morning and woke him up.  Patient reports increased swelling as the day progressed. Denies dental pain. Denies fever or chills. Otherwise feels well. Patient does grind his teeth at night.    Allergies:  No Known Allergies    Problem List:    Patient Active Problem List    Diagnosis Date Noted     Impacted cerumen 09/26/2012     Priority: Medium     Scabies 09/26/2012     Priority: Medium     Acne 08/04/2011     Priority: Medium     Flat feet 08/04/2011     Priority: Medium     CARDIOVASCULAR SCREENING; LDL GOAL LESS THAN 160 09/26/2012     Priority: Low        Past Medical History:    Past Medical History:   Diagnosis Date     Undescended testis        Past Surgical History:    Past Surgical History:   Procedure Laterality Date     NO HISTORY OF SURGERY         Family History:    Family History   Problem Relation Age of Onset     C.A.D. Father         MI at age 42 with stent placement     Hypertension Father      Lipids Father      C.A.D. Maternal Grandfather      C.A.D. Paternal Grandfather      Allergies Sister      Melanoma No family hx of        Social History:  Marital Status:  Single [1]  Social History     Tobacco Use     Smoking status: Never Smoker     Smokeless tobacco: Never Used   Substance Use Topics     Alcohol use: No     Drug use: No        Medications:      amoxicillin-clavulanate (AUGMENTIN) 875-125 MG tablet   cyclobenzaprine (FLEXERIL) 10 MG tablet   augmented betamethasone dipropionate (DIPROLENE-AF) 0.05 % ointment   cetirizine (ZYRTEC) 10 MG tablet   desonide (DESOWEN) 0.05 % cream         Review of Systems   Constitutional: Negative for fever.   HENT: Negative for congestion, ear pain and sore throat.     Respiratory: Negative for cough.    Musculoskeletal:        Left jaw pain   Skin: Negative for rash.   Neurological: Negative for dizziness, light-headedness and headaches.       Physical Exam   BP: 115/77  Heart Rate: 82  Temp: 97.9  F (36.6  C)  Weight: 62.6 kg (138 lb)  SpO2: 98 %      Physical Exam    GENERAL APPEARANCE: healthy, alert and no distress  EYES: EOMI, conjunctiva clear  HENT: bilateral ear canals clear, intact, and without inflammation. Right TM normal. Left TM normal. Nose normal.  Oropharynx without ulcers, erythema or lesions.    Diffuse swelling located over the left parotid/TMJ. No fluctuance or mass. Tenderness with palpation of the left TMJ. No overlying erythema or warmth.  NECK: supple, nontender, no lymphadenopathy  RESP: lungs clear to auscultation - no rales, rhonchi or wheezes  CV: regular rates and rhythm, normal S1 S2, no murmur noted      ED Course        Procedures             No results found for this or any previous visit (from the past 24 hour(s)).    Medications - No data to display    Assessments & Plan (with Medical Decision Making)     Likely TMJ disorder given his tooth grinding and pain starting during the night. I did consider parotid infection, parotid gland obstruction, or odontogenic infection since there is diffuse swelling. Plan as follows:  Augmentin 875 mg twice daily for 10 days. (antibiotic) for possible parotid infection.  Flexeril 10 mg at bedtime as needed for jaw stiffness (muscle relaxer). Treating TMJ disorder.  Suck on sour candy. (? Parotid obstruction)  Tylenol &/Or Ibuprofen as needed for pain.   Warm packs.  Return to the emergency department for fevers, increased swelling, redness, or increased pain.    I have reviewed the nursing notes.    I have reviewed the findings, diagnosis, plan and need for follow up with the patient.       Medication List      Started    amoxicillin-clavulanate 875-125 MG tablet  Commonly known as:  AUGMENTIN  1 tablet,  Oral, 2 TIMES DAILY     cyclobenzaprine 10 MG tablet  Commonly known as:  FLEXERIL  10 mg, Oral, AT BEDTIME PRN            Final diagnoses:   Jaw pain - Left (TMJ vs parotitis)       6/4/2019   [facility] EMERGENCY DEPARTMENT     Elvira Hansen APRN CNP  06/05/19 [id number]

## 2019-06-04 NOTE — ED AVS SNAPSHOT
[facility] Emergency Department  [address] [facility] [address]  Phone:  [phone]  Fax:  [phone]                                    Berny Fowler   MRN: [id number]    Department:  [facility] Emergency Department   Date of Visit:  6/4/2019           After Visit Summary Signature Page    I have received my discharge instructions, and my questions have been answered. I have discussed any challenges I see with this plan with the nurse or doctor.    ..........................................................................................................................................  Patient/Patient Representative Signature      ..........................................................................................................................................  Patient Representative Print Name and Relationship to Patient    ..................................................               ................................................  Date                                   Time    ..........................................................................................................................................  Reviewed by Signature/Title    ...................................................              ..............................................  Date                                               Time          22EPIC Rev 08/18

## 2019-06-05 ASSESSMENT — ENCOUNTER SYMPTOMS
LIGHT-HEADEDNESS: 0
COUGH: 0
HEADACHES: 0
FEVER: 0
DIZZINESS: 0
SORE THROAT: 0

## 2021-12-13 ENCOUNTER — OFFICE VISIT (OUTPATIENT)
Dept: FAMILY MEDICINE | Facility: CLINIC | Age: 31
End: 2021-12-13
Payer: COMMERCIAL

## 2021-12-13 VITALS
HEART RATE: 73 BPM | DIASTOLIC BLOOD PRESSURE: 76 MMHG | BODY MASS INDEX: 22.21 KG/M2 | TEMPERATURE: 97.5 F | WEIGHT: 150.4 LBS | OXYGEN SATURATION: 98 % | SYSTOLIC BLOOD PRESSURE: 110 MMHG

## 2021-12-13 DIAGNOSIS — R10.11 ABDOMINAL PAIN, RIGHT UPPER QUADRANT: Primary | ICD-10-CM

## 2021-12-13 LAB
ALBUMIN SERPL-MCNC: 3.8 G/DL (ref 3.4–5)
ALP SERPL-CCNC: 108 U/L (ref 40–150)
ALT SERPL W P-5'-P-CCNC: 113 U/L (ref 0–70)
ANION GAP SERPL CALCULATED.3IONS-SCNC: 4 MMOL/L (ref 3–14)
AST SERPL W P-5'-P-CCNC: 39 U/L (ref 0–45)
BILIRUB SERPL-MCNC: 0.4 MG/DL (ref 0.2–1.3)
BUN SERPL-MCNC: 12 MG/DL (ref 7–30)
CALCIUM SERPL-MCNC: 8.9 MG/DL (ref 8.5–10.1)
CHLORIDE BLD-SCNC: 108 MMOL/L (ref 94–109)
CO2 SERPL-SCNC: 30 MMOL/L (ref 20–32)
CREAT SERPL-MCNC: 0.85 MG/DL (ref 0.66–1.25)
ERYTHROCYTE [DISTWIDTH] IN BLOOD BY AUTOMATED COUNT: 11.8 % (ref 10–15)
GFR SERPL CREATININE-BSD FRML MDRD: >90 ML/MIN/1.73M2
GLUCOSE BLD-MCNC: 98 MG/DL (ref 70–99)
HCT VFR BLD AUTO: 47.2 % (ref 40–53)
HGB BLD-MCNC: 16.5 G/DL (ref 13.3–17.7)
MCH RBC QN AUTO: 31.3 PG (ref 26.5–33)
MCHC RBC AUTO-ENTMCNC: 35 G/DL (ref 31.5–36.5)
MCV RBC AUTO: 89 FL (ref 78–100)
PLATELET # BLD AUTO: 261 10E3/UL (ref 150–450)
POTASSIUM BLD-SCNC: 4.2 MMOL/L (ref 3.4–5.3)
PROT SERPL-MCNC: 8 G/DL (ref 6.8–8.8)
RBC # BLD AUTO: 5.28 10E6/UL (ref 4.4–5.9)
SODIUM SERPL-SCNC: 142 MMOL/L (ref 133–144)
WBC # BLD AUTO: 10.3 10E3/UL (ref 4–11)

## 2021-12-13 PROCEDURE — 80053 COMPREHEN METABOLIC PANEL: CPT | Performed by: NURSE PRACTITIONER

## 2021-12-13 PROCEDURE — 36415 COLL VENOUS BLD VENIPUNCTURE: CPT | Performed by: NURSE PRACTITIONER

## 2021-12-13 PROCEDURE — 99214 OFFICE O/P EST MOD 30 MIN: CPT | Performed by: NURSE PRACTITIONER

## 2021-12-13 PROCEDURE — 85027 COMPLETE CBC AUTOMATED: CPT | Performed by: NURSE PRACTITIONER

## 2021-12-13 NOTE — PROGRESS NOTES
Assessment & Plan     Abdominal pain, right upper quadrant  -recommended US to check for possible gallstones, cholecystitis   -recommended to follow bland diet, avoid fatty meals   -if US and labs normal, will consider HIDA scan for evaluation of possible dysfunctional gallbladder  if symptoms reoccur   - US Abdomen Limited; Future  - Comprehensive metabolic panel (BMP + Alb, Alk Phos, ALT, AST, Total. Bili, TP); Future  - CBC with platelets; Future  - CBC with platelets  - Comprehensive metabolic panel (BMP + Alb, Alk Phos, ALT, AST, Total. Bili, TP)    [id number]}     See Patient Instructions    Return in about 1 week (around 12/20/2021), or if symptoms worsen or fail to improve.    [facility]ALINA Perez [facility] is a 31 year old who presents for the following health issues     History of Present Illness       He eats 0-1 servings of fruits and vegetables daily.He consumes 0 sweetened beverage(s) daily.He exercises with enough effort to increase his heart rate 10 to 19 minutes per day.  He exercises with enough effort to increase his heart rate 3 or less days per week.   He is taking medications regularly.       Abdominal Pain      Duration: He had abdominal pain last Thursday that lasted from 10:30 PM to Friday morning at 5:30 AM. Vitals Were 149/99 pulse 77 on Thu. Patient reports he ate about two bowels of ice cream before he started feeling sick     Description (location/character/radiation): Had a sharp  Pain underneath his ribs on the right side last week. No pain right now.        Associated flank pain: no    Intensity:  7/10 last week. Right now 0/10.     Accompanying signs and symptoms:        Fever/Chills: no        Gas/Bloating: no        Nausea/vomitting: no        Diarrhea: YES- last week        Dysuria or Hematuria: no     History (previous similar pain/trauma/previous testing): none     Precipitating or alleviating factors:       Pain worse with  eating/BM/urination: none        Pain relieved by BM: YES    Therapies tried and outcome: TYLENOL     LMP:  not applicable           Review of Systems   Constitutional, HEENT, cardiovascular, pulmonary, gi and gu systems are negative, except as otherwise noted.      Objective    /76 (BP Location: Right arm, Patient Position: Sitting, Cuff Size: Adult Large)   Pulse 73   Temp 97.5  F (36.4  C) (Tympanic)   Wt 68.2 kg (150 lb 6.4 oz)   SpO2 98%   BMI 22.21 kg/m    Body mass index is 22.21 kg/m .  Physical Exam   GENERAL: healthy, alert and no distress  EYES: Eyes grossly normal to inspection, PERRL and conjunctivae and sclerae normal  ABDOMEN: soft, nontender, no hepatosplenomegaly, no masses and bowel sounds normal  SKIN: no suspicious lesions or rashes  NEURO: Normal strength and tone, mentation intact and speech normal  PSYCH: mentation appears normal, affect normal/bright

## 2021-12-14 ENCOUNTER — HOSPITAL ENCOUNTER (OUTPATIENT)
Dept: ULTRASOUND IMAGING | Facility: CLINIC | Age: 31
Discharge: HOME | End: 2021-12-14
Attending: NURSE PRACTITIONER | Admitting: NURSE PRACTITIONER
Payer: COMMERCIAL

## 2021-12-14 DIAGNOSIS — R10.11 ABDOMINAL PAIN, RIGHT UPPER QUADRANT: ICD-10-CM

## 2021-12-14 PROCEDURE — 76705 ECHO EXAM OF ABDOMEN: CPT

## 2022-06-02 ENCOUNTER — APPOINTMENT (OUTPATIENT)
Dept: ULTRASOUND IMAGING | Facility: CLINIC | Age: 32
End: 2022-06-02
Attending: EMERGENCY MEDICINE
Payer: COMMERCIAL

## 2022-06-02 ENCOUNTER — HOSPITAL ENCOUNTER (EMERGENCY)
Facility: CLINIC | Age: 32
Discharge: HOME | End: 2022-06-02
Attending: EMERGENCY MEDICINE | Admitting: EMERGENCY MEDICINE
Payer: COMMERCIAL

## 2022-06-02 VITALS
WEIGHT: 150 LBS | DIASTOLIC BLOOD PRESSURE: 78 MMHG | TEMPERATURE: 97.4 F | BODY MASS INDEX: 22.22 KG/M2 | HEIGHT: 69 IN | RESPIRATION RATE: 16 BRPM | SYSTOLIC BLOOD PRESSURE: 114 MMHG | HEART RATE: 98 BPM | OXYGEN SATURATION: 98 %

## 2022-06-02 DIAGNOSIS — N50.3 CYST OF EPIDIDYMIS DETERMINED BY ULTRASOUND: ICD-10-CM

## 2022-06-02 DIAGNOSIS — N50.819 PAIN IN TESTICLE DUE TO TRAUMA: ICD-10-CM

## 2022-06-02 LAB
ALBUMIN UR-MCNC: NEGATIVE MG/DL
APPEARANCE UR: CLEAR
BILIRUB UR QL STRIP: NEGATIVE
COLOR UR AUTO: ABNORMAL
GLUCOSE UR STRIP-MCNC: NEGATIVE MG/DL
HGB UR QL STRIP: NEGATIVE
KETONES UR STRIP-MCNC: 5 MG/DL
LEUKOCYTE ESTERASE UR QL STRIP: NEGATIVE
MUCOUS THREADS #/AREA URNS LPF: PRESENT /LPF
NITRATE UR QL: NEGATIVE
PH UR STRIP: 7 [PH] (ref 5–7)
RBC URINE: <1 /HPF
SP GR UR STRIP: 1.01 (ref 1–1.03)
UROBILINOGEN UR STRIP-MCNC: NORMAL MG/DL
WBC URINE: 1 /HPF

## 2022-06-02 PROCEDURE — 99282 EMERGENCY DEPT VISIT SF MDM: CPT | Performed by: EMERGENCY MEDICINE

## 2022-06-02 PROCEDURE — 99284 EMERGENCY DEPT VISIT MOD MDM: CPT | Mod: 25 | Performed by: EMERGENCY MEDICINE

## 2022-06-02 PROCEDURE — 76870 US EXAM SCROTUM: CPT

## 2022-06-02 PROCEDURE — 81003 URINALYSIS AUTO W/O SCOPE: CPT | Performed by: EMERGENCY MEDICINE
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
## 2022-06-03 NOTE — DISCHARGE INSTRUCTIONS
Return if symptoms worsen or new symptoms develop.  Follow-up with primary care physician next available.  Drink plenty of fluids.  If any further testicular pain and swelling blood in urine or other symptoms present please return for further evaluation and care.  Ibuprofen or Tylenol for pain.  You can elevate your scrotum and wear strong support.  If increased swelling pain blood in your urine or other symptoms present please return for further evaluation and care.
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
## 2023-05-31 ENCOUNTER — APPOINTMENT (OUTPATIENT)
Dept: CT IMAGING | Facility: CLINIC | Age: 33
End: 2023-05-31
Attending: EMERGENCY MEDICINE
Payer: COMMERCIAL

## 2023-05-31 ENCOUNTER — HOSPITAL ENCOUNTER (EMERGENCY)
Facility: CLINIC | Age: 33
Discharge: HOME | End: 2023-06-01
Attending: EMERGENCY MEDICINE | Admitting: EMERGENCY MEDICINE
Payer: COMMERCIAL

## 2023-05-31 ENCOUNTER — APPOINTMENT (OUTPATIENT)
Dept: ULTRASOUND IMAGING | Facility: CLINIC | Age: 33
End: 2023-05-31
Attending: EMERGENCY MEDICINE
Payer: COMMERCIAL

## 2023-05-31 ENCOUNTER — HOSPITAL ENCOUNTER (EMERGENCY)
Facility: CLINIC | Age: 33
Discharge: HOME | End: 2023-05-31
Attending: EMERGENCY MEDICINE | Admitting: EMERGENCY MEDICINE
Payer: COMMERCIAL

## 2023-05-31 VITALS
HEIGHT: 69 IN | HEART RATE: 113 BPM | WEIGHT: 136 LBS | SYSTOLIC BLOOD PRESSURE: 153 MMHG | BODY MASS INDEX: 20.14 KG/M2 | RESPIRATION RATE: 18 BRPM | OXYGEN SATURATION: 97 % | DIASTOLIC BLOOD PRESSURE: 96 MMHG

## 2023-05-31 DIAGNOSIS — R45.851 SUICIDAL IDEATION: ICD-10-CM

## 2023-05-31 DIAGNOSIS — S76.311A HAMSTRING MUSCLE STRAIN, RIGHT, INITIAL ENCOUNTER: ICD-10-CM

## 2023-05-31 DIAGNOSIS — G47.00 INSOMNIA, UNSPECIFIED TYPE: ICD-10-CM

## 2023-05-31 DIAGNOSIS — G44.209 TENSION HEADACHE: ICD-10-CM

## 2023-05-31 DIAGNOSIS — G47.00 PERSISTENT INSOMNIA: ICD-10-CM

## 2023-05-31 LAB
ANION GAP SERPL CALCULATED.3IONS-SCNC: 12 MMOL/L (ref 7–15)
BASOPHILS # BLD AUTO: 0 10E3/UL (ref 0–0.2)
BASOPHILS NFR BLD AUTO: 0 %
BUN SERPL-MCNC: 14.1 MG/DL (ref 6–20)
CALCIUM SERPL-MCNC: 9.5 MG/DL (ref 8.6–10)
CHLORIDE SERPL-SCNC: 105 MMOL/L (ref 98–107)
CREAT SERPL-MCNC: 0.96 MG/DL (ref 0.67–1.17)
DEPRECATED HCO3 PLAS-SCNC: 26 MMOL/L (ref 22–29)
EOSINOPHIL # BLD AUTO: 0 10E3/UL (ref 0–0.7)
EOSINOPHIL NFR BLD AUTO: 0 %
ERYTHROCYTE [DISTWIDTH] IN BLOOD BY AUTOMATED COUNT: 12.3 % (ref 10–15)
GFR SERPL CREATININE-BSD FRML MDRD: >90 ML/MIN/1.73M2
GLUCOSE SERPL-MCNC: 106 MG/DL (ref 70–99)
HCT VFR BLD AUTO: 45.5 % (ref 40–53)
HGB BLD-MCNC: 16 G/DL (ref 13.3–17.7)
IMM GRANULOCYTES # BLD: 0.1 10E3/UL
IMM GRANULOCYTES NFR BLD: 0 %
LYMPHOCYTES # BLD AUTO: 2 10E3/UL (ref 0.8–5.3)
LYMPHOCYTES NFR BLD AUTO: 18 %
MCH RBC QN AUTO: 31.3 PG (ref 26.5–33)
MCHC RBC AUTO-ENTMCNC: 35.2 G/DL (ref 31.5–36.5)
MCV RBC AUTO: 89 FL (ref 78–100)
MONOCYTES # BLD AUTO: 0.7 10E3/UL (ref 0–1.3)
MONOCYTES NFR BLD AUTO: 6 %
NEUTROPHILS # BLD AUTO: 8.7 10E3/UL (ref 1.6–8.3)
NEUTROPHILS NFR BLD AUTO: 76 %
NRBC # BLD AUTO: 0 10E3/UL
NRBC BLD AUTO-RTO: 0 /100
PLATELET # BLD AUTO: 240 10E3/UL (ref 150–450)
POTASSIUM SERPL-SCNC: 3.6 MMOL/L (ref 3.4–5.3)
RBC # BLD AUTO: 5.12 10E6/UL (ref 4.4–5.9)
SODIUM SERPL-SCNC: 143 MMOL/L (ref 136–145)
WBC # BLD AUTO: 11.6 10E3/UL (ref 4–11)

## 2023-05-31 PROCEDURE — 99284 EMERGENCY DEPT VISIT MOD MDM: CPT | Performed by: EMERGENCY MEDICINE

## 2023-05-31 PROCEDURE — 93971 EXTREMITY STUDY: CPT | Mod: RT

## 2023-05-31 PROCEDURE — 90791 PSYCH DIAGNOSTIC EVALUATION: CPT

## 2023-05-31 PROCEDURE — 99285 EMERGENCY DEPT VISIT HI MDM: CPT | Mod: 25

## 2023-05-31 PROCEDURE — 85025 COMPLETE CBC W/AUTO DIFF WBC: CPT | Performed by: EMERGENCY MEDICINE

## 2023-05-31 PROCEDURE — 99284 EMERGENCY DEPT VISIT MOD MDM: CPT | Mod: 25,27

## 2023-05-31 PROCEDURE — 36415 COLL VENOUS BLD VENIPUNCTURE: CPT | Performed by: EMERGENCY MEDICINE

## 2023-05-31 PROCEDURE — 250N000013 HC RX MED GY IP 250 OP 250 PS 637: Performed by: EMERGENCY MEDICINE

## 2023-05-31 PROCEDURE — 82310 ASSAY OF CALCIUM: CPT | Performed by: EMERGENCY MEDICINE

## 2023-05-31 PROCEDURE — 70450 CT HEAD/BRAIN W/O DYE: CPT

## 2023-05-31 RX ORDER — ACETAMINOPHEN 325 MG/1
650 TABLET ORAL ONCE
Status: COMPLETED | OUTPATIENT
Start: 2023-05-31 | End: 2023-05-31

## 2023-05-31 RX ORDER — HYDROXYZINE PAMOATE 50 MG/1
50-100 CAPSULE ORAL 4 TIMES DAILY PRN
Qty: 40 CAPSULE | Refills: 0 | Status: SHIPPED | OUTPATIENT
Start: 2023-05-31 | End: 2023-06-22

## 2023-05-31 RX ADMIN — ACETAMINOPHEN 650 MG: 325 TABLET, FILM COATED ORAL at 20:56

## 2023-05-31 ASSESSMENT — ACTIVITIES OF DAILY LIVING (ADL)
ADLS_ACUITY_SCORE: 35

## 2023-05-31 NOTE — ED TRIAGE NOTES
EMS Arrival Note  Arrives Triage by EMS. Complains of a frontal headache this evening. Also has been having sinus pressure for multiple days.     Berny's Story  Complains of a headache across his forehead. This began after taking a prescribed medicine from his last visit. This was Vistaril.

## 2023-05-31 NOTE — ED TRIAGE NOTES
Pt arrived via EMS. Pt walked from ambulance bay to room. Has c/o bilateral leg cramping after going for a long walk yesterday and sitting vesta cross for too long, per pt. Pt also reports insomnia and being awake for 92 hours. States this is his usual pattern and was asking for sedatives. Pt took ibuprofen and melatonin prior to arrival.      Triage Assessment     Row Name 05/31/23 0647       Triage Assessment (Adult)    Airway WDL WDL       Respiratory WDL    Respiratory WDL WDL       Skin Circulation/Temperature WDL    Skin Circulation/Temperature WDL WDL       Cardiac WDL    Cardiac WDL WDL       Peripheral/Neurovascular WDL    Peripheral Neurovascular WDL WDL       Cognitive/Neuro/Behavioral WDL    Cognitive/Neuro/Behavioral WDL WDL

## 2023-05-31 NOTE — ED PROVIDER NOTES
History     Chief Complaint   Patient presents with     Leg Pain     HPI  Berny Fowler is a 33 year old male with right posterior upper leg pain soreness exacerbated by trying to rub it out with his hand last velasquez at ~ 11:00 PM last evening.  No clear mechanism injury when he had been jumping up and down yesterday.  No leg injury or trauma.  No leg swelling, redness, pallor, neurovascular or CMS dysfunction.  No chest pain, cough, hemoptysis or shortness of breath.  No history of DVT/PE or known risk factors for these. He has insomnia and would a med to help him sleep, he states he has been up playing video games all night the past few nights. No other acute systemic signs, symptoms, complaints or concerns.    Allergies:  No Known Allergies    Problem List:    Patient Active Problem List    Diagnosis Date Noted     Impacted cerumen 09/26/2012     Priority: Medium     Scabies 09/26/2012     Priority: Medium     Acne 08/04/2011     Priority: Medium     Flat feet 08/04/2011     Priority: Medium     CARDIOVASCULAR SCREENING; LDL GOAL LESS THAN 160 09/26/2012     Priority: Low        Past Medical History:    Past Medical History:   Diagnosis Date     Undescended testis        Past Surgical History:    Past Surgical History:   Procedure Laterality Date     NO HISTORY OF SURGERY         Family History:    Family History   Problem Relation Age of Onset     C.A.D. Father         MI at age 42 with stent placement     Hypertension Father      Lipids Father      C.A.D. Maternal Grandfather      C.A.D. Paternal Grandfather      Allergies Sister      Cancer Paternal Grandmother         bone      Melanoma No family hx of        Social History:  Marital Status:  Single [1]  Social History     Tobacco Use     Smoking status: Never     Smokeless tobacco: Never   Substance Use Topics     Alcohol use: Yes     Comment: social      Drug use: No        Medications:    hydrOXYzine (VISTARIL) 50 MG capsule        Review of Systems  "  As mentioned in the HPI, in addition focused review of systems was negative.    Physical Exam   BP: (!) 150/101  Pulse: 94  Resp: 18  Height: 175.3 cm (5' 9\")  Weight: 61.7 kg (136 lb)  SpO2: 99 %      Physical Exam  Vitals and nursing note reviewed.   Constitutional:       General: He is not in acute distress.     Appearance: Normal appearance. He is well-developed. He is not ill-appearing or diaphoretic.   HENT:      Head: Normocephalic and atraumatic.      Right Ear: External ear normal.      Left Ear: External ear normal.   Eyes:      General: No scleral icterus.     Extraocular Movements: Extraocular movements intact.      Conjunctiva/sclera: Conjunctivae normal.   Neck:      Trachea: No tracheal deviation.   Cardiovascular:      Rate and Rhythm: Normal rate and regular rhythm.      Heart sounds: Normal heart sounds. No murmur heard.     No friction rub. No gallop.   Pulmonary:      Effort: Pulmonary effort is normal. No respiratory distress.      Breath sounds: Normal breath sounds. No wheezing, rhonchi or rales.   Musculoskeletal:         General: Tenderness (Right hamstring soft tissue tenderness as diagrammed, no contusion, abrasion, erythema, warmth rash or lesions.  No swelling, cords.  Leg neurovascular function intact.) present. No swelling. Normal range of motion.      Cervical back: Normal range of motion and neck supple.      Right lower leg: No edema.      Left lower leg: No edema.        Legs:    Skin:     General: Skin is warm and dry.      Coloration: Skin is not jaundiced or pale.      Findings: No bruising, erythema, lesion or rash.   Neurological:      General: No focal deficit present.      Mental Status: He is alert and oriented to person, place, and time.      Sensory: No sensory deficit.      Motor: No weakness.   Psychiatric:         Mood and Affect: Mood normal.         Behavior: Behavior normal.         ED Course           Procedures              Results for orders placed or performed " during the hospital encounter of 05/31/23 (from the past 24 hour(s))   US Lower Extremity Venous Duplex Right    Narrative    US LOWER EXTREMITY VENOUS DUPLEX RIGHT 5/31/2023 9:30 AM    CLINICAL HISTORY: atraumatic posterior right upper leg pain  TECHNIQUE: Venous Duplex ultrasound of the right lower extremity with  and without compression, augmentation and duplex. Color flow and  spectral Doppler with waveform analysis performed.    COMPARISON: None.    FINDINGS: Exam includes the common femoral, femoral, popliteal, and  contralateral common femoral veins as well as segmentally visualized  deep calf veins and greater saphenous vein.     RIGHT: No deep vein thrombosis. No superficial thrombophlebitis. No  popliteal cyst.      Impression    IMPRESSION:  1.  No deep venous thrombosis in the right lower extremity.    JUANITA VICTOR MD         SYSTEM ID:  [id number]       Medications - No data to display    Assessments & Plan (with Medical Decision Making)   33 year old male with right posterior upper leg pain soreness exacerbated by trying to rub it out with his hand last velasquez at ~ 11:00 PM last evening.  No clear mechanism injury when he had been jumping up and down yesterday.  No leg injury or trauma.  No leg swelling, redness, pallor, neurovascular or CMS dysfunction. No other signs or symptoms of DVT or PE and he has no history of DVT/PE or known risk factors for these. He has insomnia and would a med to help him sleep, he states he has been up playing video games all night the past few nights.   Due to the unusual atraumatic nature of this a right lower extremity ultrasound study was performed and was unremarkable.  I doubt DVT/PE or other emergent disease process.  I applied a compressive Ace wrap, instructed him on supportive care and he will use OTC NSAID.  I made an orthopedic  referral for follow-up in sports medicine clinic.  I prescribed hydroxyzine for insomnia and instructed him on use of  Benadryl and melatonin.  Recommended he follow-up in primary care clinic later this week and return if his symptoms are not improving.  He was provided instructions for supportive care and will return as needed for worsened condition or worsening symptoms, or new problems or concerns.      I have reviewed the nursing notes.    I have reviewed the findings, diagnosis, plan and need for follow up with the patient.    Medical Decision Making: Moderate complexity      New Prescriptions    Ibuprofen 400 to 600 mg po q 6 hrs prn pain (OTC)       hydrOXYzine (VISTARIL) 50 MG capsule 40 capsule 0 5/31/2023  No   Sig - Route: Take 1-2 capsules ( mg) by mouth 4 times daily as needed for anxiety (or insomnia) - Oral   Sent to pharmacy as: hydrOXYzine Pamoate 50 MG Oral Capsule (VISTARIL)   Class: E-Prescribe         Final diagnoses:   Hamstring muscle strain, right, initial encounter   Insomnia, unspecified type       5/31/2023   [facility] EMERGENCY DEPT     Harinder Chen MD  06/01/23 [id number]

## 2023-06-01 ENCOUNTER — NURSE TRIAGE (OUTPATIENT)
Dept: NURSING | Facility: CLINIC | Age: 33
End: 2023-06-01
Payer: COMMERCIAL

## 2023-06-01 VITALS
SYSTOLIC BLOOD PRESSURE: 129 MMHG | RESPIRATION RATE: 16 BRPM | HEART RATE: 88 BPM | DIASTOLIC BLOOD PRESSURE: 88 MMHG | WEIGHT: 146 LBS | HEIGHT: 69 IN | TEMPERATURE: 99.1 F | BODY MASS INDEX: 21.62 KG/M2 | OXYGEN SATURATION: 98 %

## 2023-06-01 LAB
HOLD SPECIMEN: NORMAL
HOLD SPECIMEN: NORMAL

## 2023-06-01 PROCEDURE — 250N000013 HC RX MED GY IP 250 OP 250 PS 637: Performed by: EMERGENCY MEDICINE

## 2023-06-01 RX ORDER — DIPHENHYDRAMINE HCL 25 MG
25 CAPSULE ORAL ONCE
Status: COMPLETED | OUTPATIENT
Start: 2023-06-01 | End: 2023-06-01

## 2023-06-01 RX ORDER — OLANZAPINE 5 MG/1
5 TABLET, ORALLY DISINTEGRATING ORAL ONCE
Status: COMPLETED | OUTPATIENT
Start: 2023-06-01 | End: 2023-06-01

## 2023-06-01 RX ADMIN — OLANZAPINE 5 MG: 5 TABLET, ORALLY DISINTEGRATING ORAL at 01:23

## 2023-06-01 ASSESSMENT — COLUMBIA-SUICIDE SEVERITY RATING SCALE - C-SSRS
TOTAL  NUMBER OF ABORTED OR SELF INTERRUPTED ATTEMPTS LIFETIME: NO
6. HAVE YOU EVER DONE ANYTHING, STARTED TO DO ANYTHING, OR PREPARED TO DO ANYTHING TO END YOUR LIFE?: NO
1. HAVE YOU WISHED YOU WERE DEAD OR WISHED YOU COULD GO TO SLEEP AND NOT WAKE UP?: NO
TOTAL  NUMBER OF INTERRUPTED ATTEMPTS LIFETIME: NO
ATTEMPT LIFETIME: NO
2. HAVE YOU ACTUALLY HAD ANY THOUGHTS OF KILLING YOURSELF?: NO

## 2023-06-01 ASSESSMENT — ACTIVITIES OF DAILY LIVING (ADL)
ADLS_ACUITY_SCORE: 35

## 2023-06-01 NOTE — TELEPHONE ENCOUNTER
Mom calling. No c2c on file. Patient is in the shower and unable to provide assessment details.    Mom states that pt was at ED last night. Now has patches on his back. No fever. No vomiting. Mom does not know if there is pain or itching. Had tick bite last week.    Mom has questions:  1) blood drawn at ED and asks if additional testing can be done for Lym'es disease. FNA said no, explained that pt was seen for a different reason and saved blood may not be in the appropriate tube for Lyme's testing. FNA advised that pt needs to be seen again if needing Lyme's tests.    2) asks if Benadryl can be given. FNA informed mom that pt needs to call or report symptoms before FNA can give any advice on OTC meds. FNA explained that more assessment information is needed.    Mom verbalized understanding and will have pt call back.    Ewa Moore RN/[place] Nurse Advisor              Reason for Disposition    [1] Caller is not with the adult (patient) AND [2] probable NON-URGENT symptoms    Protocols used: INFORMATION ONLY CALL - NO TRIAGE-A-

## 2023-06-01 NOTE — CONSULTS
Diagnostic Evaluation Consultation  Crisis Assessment    Patient was assessed: Shirley  Patient location: [facility] ED  Was a release of information signed: No. Reason: No current providers      Referral Data and Chief Complaint  Berny Fowler is a 33 year old, who uses he/him pronouns, and presents to the ED with family/friends. Patient is referred to the ED by self. Patient is presenting to the ED for the following concerns: Headache and now Suicidal.      Informed Consent and Assessment Methods     Patient is his own guardian. Writer met with patient and explained the crisis assessment process, including applicable information disclosures and limits to confidentiality, assessed understanding of the process, and obtained consent to proceed with the assessment. Patient was observed to be able to participate in the assessment as evidenced by Verbal agreement. Assessment methods included conducting a formal interview with patient, review of medical records, collaboration with medical staff, and obtaining relevant collateral information from family and community providers when available..     Over the course of this crisis assessment provided reassurance, offered validation and engaged patient in problem solving and disposition planning. Patient's response to interventions was positive     Summary of Patient Situation  Patient presents to the emergency department for the second time today.  Patient was seen this morning for leg pain was discharged and returned this afternoon reporting a headache.  As patient was set to discharge and instructions were about to be given patient informed the charge nurse and the MD that he was suicidal and would kill himself if he left.  This  attempted to engage the patient who initially stared off into space and struggled to communicate.  After some rapport building patient was able to communicate effectively and efficiently.  He reports that he has not slept for 7 days and that  he has had several panic attacks today.  Patient appears to be struggling with significant anxiety specifically related to sleep.  Patient does indicate poor sleep hygiene, no routine and staying up all night watching TV or engaging in video games.  Patient does not have any established providers.  Patient denied any suicidal intent or plan to this .  Patient has no history of suicidal ideation.         Brief Psychosocial History  Patient currently lives with his mother and father and sister.  Patient does not have a job engaged in school.  Patient reports he spends his time playing video games, watching cartoons, and watching anime.  Patient denies any legal issues or Gnosticist affiliation.     Patient feels supported by mom and dad.     Significant Clinical History  Patient has no history of mental health engagement.  He has never seen a therapist, psychiatrist or taken mental health medication.  Patient is open to providers at this time.        Collateral Information  Patient's father was at bedside, supporting patient.       Risk Assessment  McKenzie Suicide Severity Rating Scale Full Clinical Version:6.1.23  Suicidal Ideation  1. Wish to be Dead (Lifetime): No  2. Non-Specific Active Suicidal Thoughts (Lifetime): No     Suicidal Behavior  Actual Attempt (Lifetime): No  Has subject engaged in non-suicidal self-injurious behavior? (Lifetime): No  Interrupted Attempts (Lifetime): No  Aborted or Self-Interrupted Attempt (Lifetime): No  Preparatory Acts or Behavior (Lifetime): No  C-SSRS Risk (Lifetime/Recent)  Calculated C-SSRS Risk Score (Lifetime/Recent): No Risk Indicated    McKenzie Suicide Severity Rating Scale Since Last Contact: NA     Validity of evaluation is not impacted by presenting factors during interview    Comments regarding subjective versus objective responses to McKenzie tool: Patient appears to exaggerate mental health symptoms.  Patient has been seen by this emergency room 2 previous  "times today and declined any mental health concerns when asked the screening questions.  When asked why patient is now reporting SI here states \"I do not know\"  Environmental or Psychosocial Events: work or task failure, barriers to accessing healthcare and unemployment/underemployment  Chronic Risk Factors:    Warning Signs: seeking access to means to hurt or kill self, talking or writing about death, dying, or suicide, hopelessness, rage, anger, seeking revenge, engaging in self-destructive behavior and recent losses (physical, financial, personal)  Protective Factors: strong bond to family unit, community support, or employment and lives in a responsibly safe and stable environment  Interpretation of Risk Scoring, Risk Mitigation Interventions and Safety Plan:  Patient has no history of suicide attempts or planning.     Does the patient have thoughts of harming others? No     Is the patient engaging in sexually inappropriate behavior?  no        Current Substance Abuse     Is there recent substance abuse? no     Was a urine drug screen or blood alcohol level obtained: No       Mental Status Exam     Affect: Appropriate   Appearance: Appropriate    Attention Span/Concentration: Inattentive  Eye Contact: Avoidant   Fund of Knowledge: Delayed    Language /Speech Content: Fluent   Language /Speech Volume: Normal    Language /Speech Rate/Productions: Normal    Recent Memory: Intact   Remote Memory: Intact   Mood: Depressed and Sad    Orientation to Person: Yes    Orientation to Place: Yes   Orientation to Time of Day: Yes    Orientation to Date: Yes    Situation (Do they understand why they are here?): Yes    Psychomotor Behavior: Normal    Thought Content: Clear   Thought Form: Intact      History of commitment: No    Medication    Psychotropic medications: No  Medication changes made in the last two weeks: No       Current Care Team    Primary Care Provider: No  Psychiatrist: No  Therapist: No  : No   "   CTSS or ARMHS: No  ACT Team: No  Other: No      Diagnosis    Unspecified depressive disorder - (F32.9)   Generalized anxiety disorder - (F41.1)      Clinical Summary and Substantiation of Recommendations    It is unclear what patient is specifically looking for in the emergency department.  Patient had come in with several medical complaints such as leg pain and headache.  As the provider was set to discharge patient he then reported suicidal ideation.  Patient did not indicate any suicidal ideation to this  but did appear anxious, and mentioned several times his struggle and inability to sleep.  Patient requested to be placed in a medically induced coma and feels he needs some sort of medication to assist him in getting some sleep.  This  did discuss patient's concerns with MD related to sleep.  This  set patient up with a therapy appointment for Saturday.  At intake appointment on Monday for day treatment programming.  This  also highly recommended patient get connected to a primary care provider who can assist him with ongoing sleep needs such as schedule sleep study.   and patient talked about creating a routine and schedule, not watching any screens 2 hours prior to bed and limiting caffeine.  Patient will discharge and does not need inpatient care.    Disposition    Recommended disposition: Individual Therapy and Programmatic Care: Daytreatment       Reviewed case and recommendations with attending provider. Attending Name: Dr. Reese        Attending concurs with disposition: Yes       Patient and/or validated legal guardian concurs with disposition: Yes       Final disposition: Individual therapy  and Programmatic care: Day treatment.     Outpatient Details (if applicable):   Aftercare plan and appointments placed in the AVS and provided to patient: Yes. Given to patient by Bedside RN    Was lethal means counseling provided as a part of aftercare planning? No;        Assessment Details    Patient interview started at: 12:15 and completed at: 1:00am.     Total duration spent on the patient case in minutes: 1.0 hrs      CPT code(s) utilized: 19812 - Psychotherapy for Crisis - 60 (30-74*) min         Nichol Champagne, ASAEL, LICSW, [facility]  DEC - Triage & Transition Services  Callback: [phone]

## 2023-06-01 NOTE — ED PROVIDER NOTES
History     Chief Complaint   Patient presents with     Headache     HPI  eBrny Fowler is a 33 year old male with past medical history with no significant past medical history who presents the emergency department complaining of headache.  Patient was seen earlier today complaining of right posterior upper leg pain patient had an ultrasound done which was unremarkable.  She was discharged with hydroxyzine and states he took the hydroxyzine when he went home and developed a headache.  Headache is tension in nature he states and is in the frontal region it it was a bad headache at one time but has improved a bit but patient does not just feel right in the head at this time and therefore called the ambulance and was brought in for further assessment and care.  He denies any recent fevers or chills he is not having visual changes he denies any neck pain or back pain has not had any chest pain or shortness of breath denies any abdominal pain.  He is not any focal numbness weakness any extremity and has not had any bowel or bladder dysfunction.  He denies a rash.    Allergies:  No Known Allergies    Problem List:    Patient Active Problem List    Diagnosis Date Noted     Impacted cerumen 09/26/2012     Priority: Medium     Scabies 09/26/2012     Priority: Medium     Acne 08/04/2011     Priority: Medium     Flat feet 08/04/2011     Priority: Medium     CARDIOVASCULAR SCREENING; LDL GOAL LESS THAN 160 09/26/2012     Priority: Low        Past Medical History:    Past Medical History:   Diagnosis Date     Undescended testis        Past Surgical History:    Past Surgical History:   Procedure Laterality Date     NO HISTORY OF SURGERY         Family History:    Family History   Problem Relation Age of Onset     IDALIA. Father         MI at age 42 with stent placement     Hypertension Father      Lipids Father      ZANDRA Maternal Grandfather      ZANDRA Paternal Grandfather      Allergies Sister      Cancer Paternal  "Grandmother         bone      Melanoma No family hx of        Social History:  Marital Status:  Single [1]  Social History     Tobacco Use     Smoking status: Never     Smokeless tobacco: Never   Substance Use Topics     Alcohol use: Yes     Comment: social      Drug use: No        Medications:    hydrOXYzine (VISTARIL) 50 MG capsule          Review of Systems  All systems reviewed and other than pertinent positives and negatives in HPI all other systems are negative.  Physical Exam   BP: (!) 157/91  Pulse: 96  Temp: 99.1  F (37.3  C)  Resp: 18  Height: 175.3 cm (5' 9\")  Weight: 66.2 kg (146 lb)  SpO2: 97 %      Physical Exam  Vitals and nursing note reviewed.   Constitutional:       General: He is not in acute distress.     Appearance: Normal appearance. He is not ill-appearing, toxic-appearing or diaphoretic.   HENT:      Head: Atraumatic.      Nose: Nose normal.      Mouth/Throat:      Mouth: Mucous membranes are moist.      Pharynx: Oropharynx is clear.   Eyes:      Conjunctiva/sclera: Conjunctivae normal.   Cardiovascular:      Rate and Rhythm: Normal rate and regular rhythm.      Pulses: Normal pulses.      Heart sounds: Normal heart sounds. No murmur heard.  Pulmonary:      Breath sounds: Normal breath sounds. No stridor. No wheezing or rhonchi.   Abdominal:      General: Abdomen is flat. Bowel sounds are normal. There is no distension.      Palpations: Abdomen is soft.      Tenderness: There is no abdominal tenderness. There is no left CVA tenderness.   Musculoskeletal:         General: No swelling or tenderness. Normal range of motion.      Cervical back: Normal range of motion and neck supple.      Right lower leg: No edema.      Left lower leg: No edema.   Skin:     General: Skin is warm and dry.      Capillary Refill: Capillary refill takes less than 2 seconds.      Findings: No rash.   Neurological:      General: No focal deficit present.      Mental Status: He is alert and oriented to person, place, " and time.      Sensory: No sensory deficit.      Motor: No weakness.      Coordination: Coordination normal.   Psychiatric:      Comments: Flat affect patient appears anxious.         ED Course                 Procedures              Critical Care time:  none                   Medications   acetaminophen (TYLENOL) tablet 650 mg (650 mg Oral $Given 5/31/23 2056)     Results for orders placed or performed during the hospital encounter of 05/31/23   CT Head w/o Contrast    Narrative    EXAM: CT HEAD W/O CONTRAST  LOCATION: [facility]  DATE/TIME: 5/31/2023 10:47 PM CDT    INDICATION: Severe headache atypical for patient  COMPARISON: None.  TECHNIQUE: Routine CT Head without IV contrast. Multiplanar reformats. Dose reduction techniques were used.    FINDINGS:  INTRACRANIAL CONTENTS: No intracranial hemorrhage, extraaxial collection, or mass effect.  No CT evidence of acute infarct. Normal parenchymal attenuation. Normal ventricles and sulci.     VISUALIZED ORBITS/SINUSES/MASTOIDS: No intraorbital abnormality. No paranasal sinus mucosal disease. No middle ear or mastoid effusion.    BONES/SOFT TISSUES: No acute abnormality.      Impression    IMPRESSION:  1.  No acute intracranial pathology. Unremarkable for age noncontrast head CT.         Assessments & Plan (with Medical Decision Making) records were reviewed including past medical history medications allergies.  Patient's recent visit earlier today.  ED visits in 2022 and 2019 were reviewed.  Basic labs were obtained.  I independently reviewed and interpreted the labs.  Patient's white count was slightly elevated at 11.6.  Hemoglobin 16.0 platelet count 240 basic metabolic panel significant for glucose 106.  CT scan of the head was obtained.  I independently reviewed and interpreted the CT scan of the head and agree with the radiologist finding of no acute intracranial pathology.  Findings were discussed in detail with the patient.  I plan  to discharge him and was talking about discharge instructions patient then said he does not want to be here or anywhere.  Stated he was having some thoughts of suicide just not wanting to be alive anymore.  States he has given up.  Due to the statements I felt a DEC consult was warranted.  They assessed the patient and felt he could be treated as an outpatient.  A therapy session was set up for Saturday and day treatment started on Monday.  Take it stated patient was in agreement with this plan and felt comfortable with when I went to talk with the patient and his father patient stated unless he gets sleep he could not do anything else.  He stated without sleep he would not be able to function and does not feel he can go home at this time.  I therefore ordered a dose of Zyprexa to help patient calm down and sleep.  His behavior has escalated and after receiving the medication he became more agitated.  Patient had been signed out to Dr. Alpesh Hutchinson who will monitor the patient for further give medications as needed.  If patient continues to escalate behavior and have further thoughts of harm or difficulty sleeping repeat DEC consultation may be warranted.     I have reviewed the nursing notes.    I have reviewed the findings, diagnosis, plan and need for follow up with the patient.           Discharge Medication List as of 6/1/2023  5:44 AM          Final diagnoses:   Tension headache   Suicidal ideation   Persistent insomnia       5/31/2023   [facility] EMERGENCY DEPT     Jeffry Reese MD  06/02/23 [id number]

## 2023-06-01 NOTE — DISCHARGE INSTRUCTIONS
Aftercare Plan    Date: Saturday, 6/3/2023  Time: 11:00 am - 12:00 pm  Provider: Aguila Ruiz MA  [facility]  Location: [facility] [place], [address], [place] [address]  Phone: [phone]  Type: Teletherapy    Date: Monday, 6/5/2023  Time: 10:00 am - 11:00 am  Provider: Rowan Aranda  Location: [facility], [address] [place] [id number]  Phone: [phone]  Type: Day Treatment    Adults 18+ DaTRAC is the longer, focused treatment component that meets 2 - 3 times per week. Morning, afternoon, and evening groups are available. STAT is the short-term comprehensive assessment and intensive stabilization component. This 10 day intensive component runs Monday through Thursday.  If I am feeling unsafe or I am in a crisis, I will:   Contact my established care providers   Call the National Suicide Prevention Lifeline: 591.252.7439   Go to the nearest emergency room   Call 911     Warning signs that I or other people might notice when a crisis is developing for me:     I am having increasing suicidal thoughts that turn to plans with intent or means  I am having additional urges to self-harm    My emotions are of hopelessness; feeling like there's no way out.  Rage or anger.  Engaging in risky activities without thinking  Withdrawing from family/friends  Dramatic mood swings  Drastic personality changes   Use of alcohol or drugs  Postings on social media  Neglect of personal hygiene or cares      Things I am able to do on my own to cope or help me feel better:    Other things to Try:  Spending quality time with loved ones  Staying hydrated  Eating balanced meals  Going for a walk every day  Take care of daily responsibilities/needs  Focus on positive self-talk vs negative self-talk     Things that I am able to do with others to cope or help me better:   Other things to Try:  Exercise  Music  Deep breathing  Meditations  Journal  Self-regulate  Self check-in  Ask for help      Things I can use or do for distraction:   Other things to Try:  Reach out to/spend time with family, friends  Shower  Exercise  Chores or do a project  Listen to music  Watch movie/TV  Listening to music  Journaling  Reading a book  Meditating  Call a friend     Changes I can make to support my mental health and wellness:    -I will abstain from all mood altering chemicals not currently prescribed to me   -I will attend scheduled mental health therapy and psychiatric appointments and follow all   recommendations  -I will commit to 30 minutes of self care daily - this can be as simple as taking a shower, going for a   walk, cooking a meal, read, writing, etc  -I will practice square breathing when I begin to feel anxious - in breath through the nose for the count   of 4 and the first line on the square. Out breath through the mouth for the count of 4 for the second line   of the square. Repeat to complete the square. Repeat the square as many times as needed.  - I will use distraction skills of: going for walks, watching TV, spending time outside, calling a friend or   family member  -Use community resources, including hotline numbers, [facility] crisis and support meetings  -Maintain a daily schedule/routine  -Practice deep breathing skills  -Download a meditation darek and spend 15-20 minutes per day mediating/relaxing. Some apps to   download include: Calm, Headspace and Insight Timer. All 3 of these apps have free version     People in my life that I can ask for help:   Family  Friends      Your [facility] has a mental health crisis team you can call 24/7: 1-139.290.1337  Crisis Hotline  If you or someone you know is in crisis, please call our toll free crisis line at 1-391.521.7223. If there is a life threatening emergency, call 911.          Crisis Lines  Crisis Text Line  Text 200179  You will be connected with a trained live crisis counselor to provide support.    Por james, kayao  ABHINAV a 829028 o texto a  "442-AYUDAME en WhatsApp    The [employer] (LGBTQ Youth Crisis Line)  6.458.025.0027  text START to 075-192      Tinypass  Fast Tracker  Linking people to mental health and substance use disorder resources  Women of Coffee.MyMosa     [place] Mental Health Warm Line  Peer to peer support  Monday thru Saturday, 12 pm to 10 pm  626.598.9838 or 8.184.954.9837  Text \"Support\" to 41378    National [place] on Mental Illness (JOSEFA)  581.314.3727 or 1.888.JOSEFA.HELPS      Mental Health Apps  My3  https://Mpax.MyMosa/    Samesurf  https://We Heart It.org/apps/virtual-hope-box/      Crisis Lines  Crisis Text Line  Text 642001  You will be connected with a trained live crisis counselor to provide support.    Por espanol, texto  ABHINAV a 970386 o texto a 442-AYUDAME en WhatsApp    National Hope Line  1.800.SUICIDE [3134723]      Tinypass  Fast Tracker  Linking people to mental health and substance use disorder resources  Women of Coffee.MyMosa     [place] Mental Health Warm Line  Peer to peer support  Monday thru Saturday, 12 pm to 10 pm  217.165.2615 or 0.011.089.1341  Text \"Support\" to 40626    [place] on Mental Illness (JOSEFA)  787.451.7718 or 1.888.JOSEFA.HELPS      Mental Health Apps  My3  https://Groove Club/    Samesurf  https://We Heart It.org/apps/virtual-hope-box/      Additional Information  Today you were seen by a licensed mental health professional through Triage and Transition services, Behavioral Healthcare Providers ([facility])  for a crisis assessment in the Emergency Department at [facility].  It is recommended that you follow up with your established providers (psychiatrist, mental health therapist, and/or primary care doctor - as relevant) as soon as possible. Coordinators from [facility] will be calling you in the next 24-48 hours to ensure that you have the resources you need.  You can also contact [facility] coordinators directly at [phone]. You may have " been scheduled for or offered an appointment with a mental health provider. [facility] maintains an extensive network of licensed behavioral health providers to connect patients with the services they need.  We do not charge providers a fee to participate in our referral network.  We match patients with providers based on a patient's specific needs, insurance coverage, and location.  Our first effort will be to refer you to a provider within your care system, and will utilize providers outside your care system as needed.

## 2023-06-01 NOTE — ED PROVIDER NOTES
Emergency Department Patient Sign-out       Brief HPI:  This is a 33 year old male signed out to me by Dr. Reese .  See initial ED Provider note for details of the presentation.            Significant Events prior to my assuming care: 33-year-old male presented initially earlier today with leg pain, thought may be related to cramping and was discharged home.  He really presented with headache and had a full evaluation that was reassuring.  At the time of his initial discharge during the second visit yesterday the patient said that he was going to kill himself if they discharged him home.  He was evaluated by [facility] and the patient was denying any suicidal ideation at that time, they helped arrange outpatient follow-up for the patient.    After attempting to discharge again the patient said that he really just needed to sleep, had racing thoughts, wanted help with anxiety.  He was given ondansetron and signed out to me for reevaluation.    Shortly after signout to me the patient was agitated in the room, screaming out, I went and reevaluated the patient.    The patient tells me that he has racing thoughts and just needs to sleep, he has been awake for the past 7 days.  He says he is not suicidal.  He is asking for shot of medication to help him sleep.  I asked for more information to try to help him but he was unwilling to talk more about what was going on.  Per the patient's father the patient lives with them, has been having trouble sleeping, says that his behavior is quite bizarre here.    The patient did fall asleep on his own.  He slept for several hours, upon recheck the patient says he is feeling better, the racing thoughts are improved.  He denies suicidal ideation.  At this point he is safe to discharge home with his father.  Mental health will be following up with the patient.  I have also placed a referral to primary care.  He is told return if worse, otherwise follow-up in clinic.  The patient is in  "agreement with this plan.      Exam:   Patient Vitals for the past 24 hrs:   BP Temp Temp src Pulse Resp SpO2 Height Weight   06/01/23 0000 (!) 146/101 -- -- 91 16 99 % -- --   05/31/23 2336 (!) 131/98 -- -- -- -- 100 % -- --   05/31/23 1841 (!) 157/91 99.1  F (37.3  C) Tympanic 96 18 97 % 1.753 m (5' 9\") 66.2 kg (146 lb)     Awake, alert, speaking clearly, no slurred words, no diaphoresis, normal pupils  No respiratory distress  Moving all extremities equally  Not rapid or pressured speech      ED RESULTS:   Results for orders placed or performed during the hospital encounter of 05/31/23 (from the past 24 hour(s))   CT Head w/o Contrast     Status: None    Collection Time: 05/31/23 10:47 PM    Narrative    EXAM: CT HEAD W/O CONTRAST  LOCATION: [facility]  DATE/TIME: 5/31/2023 10:47 PM CDT    INDICATION: Severe headache atypical for patient  COMPARISON: None.  TECHNIQUE: Routine CT Head without IV contrast. Multiplanar reformats. Dose reduction techniques were used.    FINDINGS:  INTRACRANIAL CONTENTS: No intracranial hemorrhage, extraaxial collection, or mass effect.  No CT evidence of acute infarct. Normal parenchymal attenuation. Normal ventricles and sulci.     VISUALIZED ORBITS/SINUSES/MASTOIDS: No intraorbital abnormality. No paranasal sinus mucosal disease. No middle ear or mastoid effusion.    BONES/SOFT TISSUES: No acute abnormality.      Impression    IMPRESSION:  1.  No acute intracranial pathology. Unremarkable for age noncontrast head CT.   CBC with platelets differential     Status: Abnormal    Collection Time: 05/31/23 11:10 PM    Narrative    The following orders were created for panel order CBC with platelets differential.  Procedure                               Abnormality         Status                     ---------                               -----------         ------                     CBC with platelets and d...[[id number]]  Abnormal            Final result       "           Please view results for these tests on the individual orders.   Basic metabolic panel     Status: Abnormal    Collection Time: 05/31/23 11:10 PM   Result Value Ref Range    Sodium 143 136 - 145 mmol/L    Potassium 3.6 3.4 - 5.3 mmol/L    Chloride 105 98 - 107 mmol/L    Carbon Dioxide (CO2) 26 22 - 29 mmol/L    Anion Gap 12 7 - 15 mmol/L    Urea Nitrogen 14.1 6.0 - 20.0 mg/dL    Creatinine 0.96 0.67 - 1.17 mg/dL    Calcium 9.5 8.6 - 10.0 mg/dL    Glucose 106 (H) 70 - 99 mg/dL    GFR Estimate >90 >60 mL/min/1.73m2   CBC with platelets and differential     Status: Abnormal    Collection Time: 05/31/23 11:10 PM   Result Value Ref Range    WBC Count 11.6 (H) 4.0 - 11.0 10e3/uL    RBC Count 5.12 4.40 - 5.90 10e6/uL    Hemoglobin 16.0 13.3 - 17.7 g/dL    Hematocrit 45.5 40.0 - 53.0 %    MCV 89 78 - 100 fL    MCH 31.3 26.5 - 33.0 pg    MCHC 35.2 31.5 - 36.5 g/dL    RDW 12.3 10.0 - 15.0 %    Platelet Count 240 150 - 450 10e3/uL    % Neutrophils 76 %    % Lymphocytes 18 %    % Monocytes 6 %    % Eosinophils 0 %    % Basophils 0 %    % Immature Granulocytes 0 %    NRBCs per 100 WBC 0 <1 /100    Absolute Neutrophils 8.7 (H) 1.6 - 8.3 10e3/uL    Absolute Lymphocytes 2.0 0.8 - 5.3 10e3/uL    Absolute Monocytes 0.7 0.0 - 1.3 10e3/uL    Absolute Eosinophils 0.0 0.0 - 0.7 10e3/uL    Absolute Basophils 0.0 0.0 - 0.2 10e3/uL    Absolute Immature Granulocytes 0.1 <=0.4 10e3/uL    Absolute NRBCs 0.0 10e3/uL   Extra Tube     Status: None    Collection Time: 05/31/23 11:10 PM    Narrative    The following orders were created for panel order Extra Tube.  Procedure                               Abnormality         Status                     ---------                               -----------         ------                     Extra Blue Top Tube[[id number]]                              Final result               Extra Red Top Tube[[id number]]                               Final result                 Please view results for these  tests on the individual orders.   Extra Blue Top Tube     Status: None    Collection Time: 05/31/23 11:10 PM   Result Value Ref Range    Hold Specimen JI    Extra Red Top Tube     Status: None    Collection Time: 05/31/23 11:10 PM   Result Value Ref Range    Hold Specimen JI    [facility] ([facility]) Assessment Consult Order:     Status: None ()    Collection Time: 06/01/23 12:02 AM    Narrative    Nichol Gaffney, [facility]     6/1/2023  1:32 AM  Diagnostic Evaluation Consultation  Crisis Assessment    Patient was assessed: Shirley  Patient location: [facility] ED  Was a release of information signed: No. Reason: No current   providers      Referral Data and Chief Complaint  Berny Fowler is a 33 year old, who uses he/him pronouns, and   presents to the ED with family/friends. Patient is referred to   the ED by self. Patient is presenting to the ED for the following   concerns: Headache and now Suicidal.      Informed Consent and Assessment Methods     Patient is his own guardian. Writer met with patient and   explained the crisis assessment process, including applicable   information disclosures and limits to confidentiality, assessed   understanding of the process, and obtained consent to proceed   with the assessment. Patient was observed to be able to   participate in the assessment as evidenced by Verbal agreement.   Assessment methods included conducting a formal interview with   patient, review of medical records, collaboration with medical   staff, and obtaining relevant collateral information from family   and community providers when available..     Over the course of this crisis assessment provided reassurance,   offered validation and engaged patient in problem solving and   disposition planning. Patient's response to interventions was   positive     Summary of Patient Situation  Patient presents to the emergency department for the second time   today.  Patient was seen this morning for  leg pain was discharged   and returned this afternoon reporting a headache.  As patient was   set to discharge and instructions were about to be given patient   informed the charge nurse and the MD that he was suicidal and   would kill himself if he left.  This  attempted to engage   the patient who initially stared off into space and struggled to   communicate.  After some rapport building patient was able to   communicate effectively and efficiently.  He reports that he has   not slept for 7 days and that he has had several panic attacks   today.  Patient appears to be struggling with significant anxiety   specifically related to sleep.  Patient does indicate poor sleep   hygiene, no routine and staying up all night watching TV or   engaging in video games.  Patient does not have any established   providers.  Patient denied any suicidal intent or plan to this   .  Patient has no history of suicidal ideation.         Brief Psychosocial History  Patient currently lives with his mother and father and sister.    Patient does not have a job engaged in school.  Patient reports   he spends his time playing video games, watching cartoons, and   watching anime.  Patient denies any legal issues or [employer]   affiliation.     Patient feels supported by mom and dad.     Significant Clinical History  Patient has no history of mental health engagement.  He has never   seen a therapist, psychiatrist or taken mental health medication.    Patient is open to providers at this time.        Collateral Information  Patient's father was at bedside, supporting patient.       Risk Assessment  [place] Suicide Severity Rating Scale Full Clinical   Version:6.1.23  Suicidal Ideation  1. Wish to be Dead (Lifetime): No  2. Non-Specific Active Suicidal Thoughts (Lifetime): No     Suicidal Behavior  Actual Attempt (Lifetime): No  Has subject engaged in non-suicidal self-injurious behavior?   (Lifetime): No  Interrupted Attempts  "(Lifetime): No  Aborted or Self-Interrupted Attempt (Lifetime): No  Preparatory Acts or Behavior (Lifetime): No  C-SSRS Risk (Lifetime/Recent)  Calculated C-SSRS Risk Score (Lifetime/Recent): No Risk Indicated    [place] Suicide Severity Rating Scale Since Last Contact: NA     Validity of evaluation is not impacted by presenting factors   during interview    Comments regarding subjective versus objective responses to   [place] tool: Patient appears to exaggerate mental health   symptoms.  Patient has been seen by this emergency room 2   previous times today and declined any mental health concerns when   asked the screening questions.  When asked why patient is now   reporting SI here states \"I do not know\"  Environmental or Psychosocial Events: work or task failure,   barriers to accessing healthcare and unemployment/underemployment  Chronic Risk Factors:    Warning Signs: seeking access to means to hurt or kill self,   talking or writing about death, dying, or suicide, hopelessness,   rage, anger, seeking revenge, engaging in self-destructive   behavior and recent losses (physical, financial, personal)  Protective Factors: strong bond to family unit, community   support, or employment and lives in a responsibly safe and stable   environment  Interpretation of Risk Scoring, Risk Mitigation Interventions and   Safety Plan:  Patient has no history of suicide attempts or planning.     Does the patient have thoughts of harming others? No     Is the patient engaging in sexually inappropriate behavior?  no        Current Substance Abuse     Is there recent substance abuse? no     Was a urine drug screen or blood alcohol level obtained: No       Mental Status Exam     Affect: Appropriate   Appearance: Appropriate    Attention Span/Concentration: Inattentive  Eye Contact: Avoidant   Fund of Knowledge: Delayed    Language /Speech Content: Fluent   Language /Speech Volume: Normal    Language /Speech Rate/Productions: Normal "    Recent Memory: Intact   Remote Memory: Intact   Mood: Depressed and Sad    Orientation to Person: Yes    Orientation to Place: Yes   Orientation to Time of Day: Yes    Orientation to Date: Yes    Situation (Do they understand why they are here?): Yes    Psychomotor Behavior: Normal    Thought Content: Clear   Thought Form: Intact      History of commitment: No    Medication    Psychotropic medications: No  Medication changes made in the last two weeks: No       Current Care Team    Primary Care Provider: No  Psychiatrist: No  Therapist: No  : No     CTSS or ARMHS: No  ACT Team: No  Other: No      Diagnosis    Unspecified depressive disorder - (F32.9)   Generalized anxiety disorder - (F41.1)      Clinical Summary and Substantiation of Recommendations    It is unclear what patient is specifically looking for in the   emergency department.  Patient had come in with several medical   complaints such as leg pain and headache.  As the provider was   set to discharge patient he then reported suicidal ideation.    Patient did not indicate any suicidal ideation to this    but did appear anxious, and mentioned several times his struggle   and inability to sleep.  Patient requested to be placed in a   medically induced coma and feels he needs some sort of medication   to assist him in getting some sleep.  This  did discuss   patient's concerns with MD related to sleep.  This  set   patient up with a therapy appointment for Saturday.  At intake   appointment on Monday for day treatment programming.  This    also highly recommended patient get connected to a   primary care provider who can assist him with ongoing sleep needs   such as schedule sleep study.   and patient talked about   creating a routine and schedule, not watching any screens 2 hours   prior to bed and limiting caffeine.  Patient will discharge and   does not need inpatient care.    Disposition    Recommended  disposition: Individual Therapy and Programmatic   Care: Daytreatment       Reviewed case and recommendations with attending provider.   Attending Name: Dr. Reese        Attending concurs with disposition: Yes       Patient and/or validated legal guardian concurs with disposition:   Yes       Final disposition: Individual therapy  and Programmatic care: Day   treatment.     Outpatient Details (if applicable):   Aftercare plan and appointments placed in the AVS and provided to   patient: Yes. Given to patient by Bedside RN    Was lethal means counseling provided as a part of aftercare   planning? No;       Assessment Details    Patient interview started at: 12:15 and completed at: 1:00am.     Total duration spent on the patient case in minutes: 1.0 hrs      CPT code(s) utilized: 72596 - Psychotherapy for Crisis - 60   (30-74*) min         Nichol Champagne, ASAEL, [facility], [facility]  DEC - Triage & Transition Services  Callback: [phone]                 ED MEDICATIONS:   Medications   acetaminophen (TYLENOL) tablet 650 mg (650 mg Oral $Given 5/31/23 2056)   diphenhydrAMINE (BENADRYL) capsule 25 mg (25 mg Oral Not Given 6/1/23 0120)   OLANZapine zydis (zyPREXA) ODT tab 5 mg (5 mg Oral $Given 6/1/23 0123)         Impression:    ICD-10-CM    1. Tension headache  G44.209       2. Suicidal ideation  R45.851       3. Persistent insomnia  G47.00             MD Autumn Garcia Nicholas Hall, MD  06/01/23 0528

## 2023-06-01 NOTE — ED NOTES
"Pt began screaming \"sleep, sleep, I just want to sleep\". Pt hanging of side of cart rubbing his head stating \"sleep, just make me sleep\". Pt assisted back into bed. Pt instructed he was just given an ODT medication to aid in him sleeping, but he need to allow time for it to work. Pt educated to try and close his eyes and just rest. Pt states \"ok, I will try\". Pt now lying quietly in bed at this time.    "

## 2023-06-02 ENCOUNTER — APPOINTMENT (OUTPATIENT)
Dept: GENERAL RADIOLOGY | Facility: CLINIC | Age: 33
End: 2023-06-02
Attending: EMERGENCY MEDICINE
Payer: COMMERCIAL

## 2023-06-02 ENCOUNTER — HOSPITAL ENCOUNTER (EMERGENCY)
Facility: CLINIC | Age: 33
Discharge: HOME | End: 2023-06-02
Attending: EMERGENCY MEDICINE | Admitting: EMERGENCY MEDICINE
Payer: COMMERCIAL

## 2023-06-02 ENCOUNTER — HOSPITAL ENCOUNTER (EMERGENCY)
Facility: CLINIC | Age: 33
Discharge: HOME | End: 2023-06-02
Payer: COMMERCIAL

## 2023-06-02 VITALS
HEART RATE: 80 BPM | WEIGHT: 146 LBS | RESPIRATION RATE: 16 BRPM | TEMPERATURE: 98.6 F | DIASTOLIC BLOOD PRESSURE: 89 MMHG | SYSTOLIC BLOOD PRESSURE: 136 MMHG | BODY MASS INDEX: 21.56 KG/M2 | OXYGEN SATURATION: 98 %

## 2023-06-02 DIAGNOSIS — G47.00 INSOMNIA, UNSPECIFIED TYPE: ICD-10-CM

## 2023-06-02 DIAGNOSIS — R68.84 JAW PAIN: ICD-10-CM

## 2023-06-02 PROCEDURE — 99283 EMERGENCY DEPT VISIT LOW MDM: CPT | Performed by: EMERGENCY MEDICINE

## 2023-06-02 PROCEDURE — 99283 EMERGENCY DEPT VISIT LOW MDM: CPT

## 2023-06-02 PROCEDURE — 70100 X-RAY EXAM OF JAW <4VIEWS: CPT

## 2023-06-02 RX ORDER — OLANZAPINE 10 MG/1
10 TABLET ORAL AT BEDTIME
Qty: 20 TABLET | Refills: 0 | Status: SHIPPED | OUTPATIENT
Start: 2023-06-02 | End: 2023-06-22

## 2023-06-02 NOTE — ED TRIAGE NOTES
"Seen yesterday but back today & wanting to be seen for upper back, lower back, neck & right jaw pain. States he \"tweaked\" it while here but doesn't elaborate. Also got some olanzapine yesterday & hoping to get a script to help him sleep. Here yesterday for SI but states he is doing better & does not want to be seen for that today.     Triage Assessment       Row Name 06/02/23 4860       Triage Assessment (Adult)    Airway WDL WDL       Respiratory WDL    Respiratory WDL WDL       Skin Circulation/Temperature WDL    Skin Circulation/Temperature WDL WDL       Cardiac WDL    Cardiac WDL WDL       Peripheral/Neurovascular WDL    Peripheral Neurovascular WDL WDL       Cognitive/Neuro/Behavioral WDL    Cognitive/Neuro/Behavioral WDL WDL                  "

## 2023-06-03 ENCOUNTER — HOSPITAL ENCOUNTER (EMERGENCY)
Facility: CLINIC | Age: 33
Discharge: HOME | End: 2023-06-04
Attending: EMERGENCY MEDICINE | Admitting: EMERGENCY MEDICINE
Payer: COMMERCIAL

## 2023-06-03 DIAGNOSIS — G47.00 INSOMNIA, UNSPECIFIED TYPE: ICD-10-CM

## 2023-06-03 LAB
ALBUMIN SERPL BCG-MCNC: 4.6 G/DL (ref 3.5–5.2)
ALBUMIN UR-MCNC: NEGATIVE MG/DL
ALP SERPL-CCNC: 78 U/L (ref 40–129)
ALT SERPL W P-5'-P-CCNC: 42 U/L (ref 10–50)
AMPHETAMINES UR QL SCN: NORMAL
ANION GAP SERPL CALCULATED.3IONS-SCNC: 8 MMOL/L (ref 7–15)
APPEARANCE UR: CLEAR
AST SERPL W P-5'-P-CCNC: 25 U/L (ref 10–50)
BACTERIA #/AREA URNS HPF: ABNORMAL /HPF
BARBITURATES UR QL SCN: NORMAL
BASOPHILS # BLD AUTO: 0 10E3/UL (ref 0–0.2)
BASOPHILS NFR BLD AUTO: 0 %
BENZODIAZ UR QL SCN: NORMAL
BILIRUB SERPL-MCNC: 0.8 MG/DL
BILIRUB UR QL STRIP: NEGATIVE
BUN SERPL-MCNC: 12.5 MG/DL (ref 6–20)
BZE UR QL SCN: NORMAL
CALCIUM SERPL-MCNC: 9.2 MG/DL (ref 8.6–10)
CANNABINOIDS UR QL SCN: NORMAL
CHLORIDE SERPL-SCNC: 106 MMOL/L (ref 98–107)
COLOR UR AUTO: YELLOW
CREAT SERPL-MCNC: 1.01 MG/DL (ref 0.67–1.17)
CRP SERPL-MCNC: <3 MG/L
DEPRECATED HCO3 PLAS-SCNC: 27 MMOL/L (ref 22–29)
EOSINOPHIL # BLD AUTO: 0.1 10E3/UL (ref 0–0.7)
EOSINOPHIL NFR BLD AUTO: 1 %
ERYTHROCYTE [DISTWIDTH] IN BLOOD BY AUTOMATED COUNT: 11.9 % (ref 10–15)
ETHANOL SERPL-MCNC: <0.01 G/DL
GFR SERPL CREATININE-BSD FRML MDRD: >90 ML/MIN/1.73M2
GLUCOSE SERPL-MCNC: 122 MG/DL (ref 70–99)
GLUCOSE UR STRIP-MCNC: NEGATIVE MG/DL
HCT VFR BLD AUTO: 44.3 % (ref 40–53)
HGB BLD-MCNC: 15.7 G/DL (ref 13.3–17.7)
HGB UR QL STRIP: NEGATIVE
HOLD SPECIMEN: NORMAL
HOLD SPECIMEN: NORMAL
IMM GRANULOCYTES # BLD: 0 10E3/UL
IMM GRANULOCYTES NFR BLD: 0 %
KETONES UR STRIP-MCNC: 5 MG/DL
LACTATE SERPL-SCNC: 1 MMOL/L (ref 0.7–2)
LEUKOCYTE ESTERASE UR QL STRIP: NEGATIVE
LYMPHOCYTES # BLD AUTO: 1.3 10E3/UL (ref 0.8–5.3)
LYMPHOCYTES NFR BLD AUTO: 14 %
MAGNESIUM SERPL-MCNC: 2.3 MG/DL (ref 1.7–2.3)
MCH RBC QN AUTO: 31.5 PG (ref 26.5–33)
MCHC RBC AUTO-ENTMCNC: 35.4 G/DL (ref 31.5–36.5)
MCV RBC AUTO: 89 FL (ref 78–100)
MONOCYTES # BLD AUTO: 0.7 10E3/UL (ref 0–1.3)
MONOCYTES NFR BLD AUTO: 7 %
MUCOUS THREADS #/AREA URNS LPF: PRESENT /LPF
NEUTROPHILS # BLD AUTO: 7.4 10E3/UL (ref 1.6–8.3)
NEUTROPHILS NFR BLD AUTO: 78 %
NITRATE UR QL: NEGATIVE
NRBC # BLD AUTO: 0 10E3/UL
NRBC BLD AUTO-RTO: 0 /100
OPIATES UR QL SCN: NORMAL
PCP QUAL URINE (ROCHE): NORMAL
PH UR STRIP: 6 [PH] (ref 5–7)
PLATELET # BLD AUTO: 244 10E3/UL (ref 150–450)
POTASSIUM SERPL-SCNC: 3.8 MMOL/L (ref 3.4–5.3)
PROT SERPL-MCNC: 7.2 G/DL (ref 6.4–8.3)
RBC # BLD AUTO: 4.99 10E6/UL (ref 4.4–5.9)
RBC URINE: 2 /HPF
SODIUM SERPL-SCNC: 141 MMOL/L (ref 136–145)
SP GR UR STRIP: 1.02 (ref 1–1.03)
TSH SERPL DL<=0.005 MIU/L-ACNC: 1.47 UIU/ML (ref 0.3–4.2)
UROBILINOGEN UR STRIP-MCNC: NORMAL MG/DL
WBC # BLD AUTO: 9.4 10E3/UL (ref 4–11)
WBC URINE: 2 /HPF

## 2023-06-03 PROCEDURE — 36415 COLL VENOUS BLD VENIPUNCTURE: CPT | Performed by: EMERGENCY MEDICINE

## 2023-06-03 PROCEDURE — 96372 THER/PROPH/DIAG INJ SC/IM: CPT | Mod: XS | Performed by: EMERGENCY MEDICINE

## 2023-06-03 PROCEDURE — 85025 COMPLETE CBC W/AUTO DIFF WBC: CPT | Performed by: EMERGENCY MEDICINE

## 2023-06-03 PROCEDURE — 99284 EMERGENCY DEPT VISIT MOD MDM: CPT | Performed by: EMERGENCY MEDICINE

## 2023-06-03 PROCEDURE — 258N000003 HC RX IP 258 OP 636: Performed by: EMERGENCY MEDICINE

## 2023-06-03 PROCEDURE — 96374 THER/PROPH/DIAG INJ IV PUSH: CPT | Performed by: EMERGENCY MEDICINE

## 2023-06-03 PROCEDURE — 80053 COMPREHEN METABOLIC PANEL: CPT | Performed by: EMERGENCY MEDICINE

## 2023-06-03 PROCEDURE — 83605 ASSAY OF LACTIC ACID: CPT | Performed by: EMERGENCY MEDICINE

## 2023-06-03 PROCEDURE — 86140 C-REACTIVE PROTEIN: CPT | Performed by: EMERGENCY MEDICINE

## 2023-06-03 PROCEDURE — 80307 DRUG TEST PRSMV CHEM ANLYZR: CPT | Performed by: EMERGENCY MEDICINE

## 2023-06-03 PROCEDURE — 83735 ASSAY OF MAGNESIUM: CPT | Performed by: EMERGENCY MEDICINE

## 2023-06-03 PROCEDURE — 96361 HYDRATE IV INFUSION ADD-ON: CPT | Performed by: EMERGENCY MEDICINE

## 2023-06-03 PROCEDURE — 250N000011 HC RX IP 250 OP 636: Performed by: EMERGENCY MEDICINE

## 2023-06-03 PROCEDURE — 99285 EMERGENCY DEPT VISIT HI MDM: CPT | Mod: 25 | Performed by: EMERGENCY MEDICINE

## 2023-06-03 PROCEDURE — 84443 ASSAY THYROID STIM HORMONE: CPT | Performed by: EMERGENCY MEDICINE

## 2023-06-03 PROCEDURE — 96375 TX/PRO/DX INJ NEW DRUG ADDON: CPT | Performed by: EMERGENCY MEDICINE

## 2023-06-03 PROCEDURE — 82077 ASSAY SPEC XCP UR&BREATH IA: CPT | Performed by: EMERGENCY MEDICINE

## 2023-06-03 PROCEDURE — 90791 PSYCH DIAGNOSTIC EVALUATION: CPT

## 2023-06-03 PROCEDURE — 81001 URINALYSIS AUTO W/SCOPE: CPT | Performed by: EMERGENCY MEDICINE

## 2023-06-03 RX ORDER — SODIUM CHLORIDE 9 MG/ML
INJECTION, SOLUTION INTRAVENOUS CONTINUOUS
Status: DISCONTINUED | OUTPATIENT
Start: 2023-06-03 | End: 2023-06-04 | Stop reason: HOSPADM

## 2023-06-03 RX ORDER — HALOPERIDOL 5 MG/ML
2 INJECTION INTRAMUSCULAR ONCE
Status: COMPLETED | OUTPATIENT
Start: 2023-06-03 | End: 2023-06-03

## 2023-06-03 RX ORDER — LORAZEPAM 2 MG/ML
1 INJECTION INTRAMUSCULAR ONCE
Status: COMPLETED | OUTPATIENT
Start: 2023-06-03 | End: 2023-06-03

## 2023-06-03 RX ORDER — DIPHENHYDRAMINE HYDROCHLORIDE 50 MG/ML
50 INJECTION INTRAMUSCULAR; INTRAVENOUS ONCE
Status: COMPLETED | OUTPATIENT
Start: 2023-06-03 | End: 2023-06-03

## 2023-06-03 RX ADMIN — LORAZEPAM 1 MG: 2 INJECTION INTRAMUSCULAR; INTRAVENOUS at 18:50

## 2023-06-03 RX ADMIN — SODIUM CHLORIDE 1000 ML: 9 INJECTION, SOLUTION INTRAVENOUS at 17:05

## 2023-06-03 RX ADMIN — DIPHENHYDRAMINE HYDROCHLORIDE 50 MG: 50 INJECTION, SOLUTION INTRAMUSCULAR; INTRAVENOUS at 18:48

## 2023-06-03 RX ADMIN — SODIUM CHLORIDE: 9 INJECTION, SOLUTION INTRAVENOUS at 18:43

## 2023-06-03 RX ADMIN — HALOPERIDOL LACTATE 2 MG: 5 INJECTION, SOLUTION INTRAMUSCULAR at 18:46

## 2023-06-03 ASSESSMENT — ACTIVITIES OF DAILY LIVING (ADL)
ADLS_ACUITY_SCORE: 35

## 2023-06-03 ASSESSMENT — COLUMBIA-SUICIDE SEVERITY RATING SCALE - C-SSRS
2. HAVE YOU ACTUALLY HAD ANY THOUGHTS OF KILLING YOURSELF?: NO
1. SINCE LAST CONTACT, HAVE YOU WISHED YOU WERE DEAD OR WISHED YOU COULD GO TO SLEEP AND NOT WAKE UP?: NO

## 2023-06-03 NOTE — ED PROVIDER NOTES
History     Chief Complaint   Patient presents with     Insomnia     HPI  Berny Fowler is a 33 year old male who presents to the emergency department via EMS.  This patient has been seen 4 times in the past 4 days in the emergency department.  He called EMS because he thought maybe something was wrong with his ankle or foot.  There has been no trauma.  But he states that he does not know if he can feel pain and is just concerned.  He also reports that he is not sleeping.  He was prescribed Zyprexa last night when I saw him at his request, but he states he did not get much sleep with this.  When specifically asked if he is having thoughts of hurting himself, he states that he thinks he might, but is not sure.  When asked if he is having any hallucinations he states that he had been seeing small cats on the floor and also patterns of X's and O's.  He denies any recent drug use and states the only time he used drugs was 2 years ago when he smoked some marijuana but he did not like this because he got very high.    Allergies:  No Known Allergies    Problem List:    Patient Active Problem List    Diagnosis Date Noted     Impacted cerumen 09/26/2012     Priority: Medium     Scabies 09/26/2012     Priority: Medium     Acne 08/04/2011     Priority: Medium     Flat feet 08/04/2011     Priority: Medium     CARDIOVASCULAR SCREENING; LDL GOAL LESS THAN 160 09/26/2012     Priority: Low        Past Medical History:    Past Medical History:   Diagnosis Date     Undescended testis        Past Surgical History:    Past Surgical History:   Procedure Laterality Date     NO HISTORY OF SURGERY         Family History:    Family History   Problem Relation Age of Onset     C.A.D. Father         MI at age 42 with stent placement     Hypertension Father      Lipids Father      C.A.D. Maternal Grandfather      C.A.D. Paternal Grandfather      Allergies Sister      Cancer Paternal Grandmother         bone      Melanoma No family hx of   "      Social History:  Marital Status:  Single [1]  Social History     Tobacco Use     Smoking status: Never     Smokeless tobacco: Never   Substance Use Topics     Alcohol use: Yes     Comment: social      Drug use: No        Medications:    hydrOXYzine (VISTARIL) 50 MG capsule  OLANZapine (ZYPREXA) 10 MG tablet          Review of Systems   All other systems reviewed and are negative.      Physical Exam   BP: (!) 159/99  Pulse: 101  Temp: 99.8  F (37.7  C)  Resp: 16  Height: 175.3 cm (5' 9\")  Weight: 66.2 kg (146 lb)  SpO2: 93 %      Physical Exam  Vitals and nursing note reviewed.   Constitutional:       General: He is not in acute distress.     Appearance: He is well-developed. He is not ill-appearing, toxic-appearing or diaphoretic.      Comments: Patient is awake and alert, affect is flattened and bizarre.  He is protecting his airway without difficulty.   HENT:      Head: Normocephalic and atraumatic.      Mouth/Throat:      Lips: Pink.      Mouth: Mucous membranes are moist.      Pharynx: Oropharynx is clear. No oropharyngeal exudate.   Eyes:      General: Lids are normal. No scleral icterus.     Extraocular Movements: Extraocular movements intact.      Right eye: No nystagmus.      Left eye: No nystagmus.      Conjunctiva/sclera: Conjunctivae normal.      Pupils: Pupils are equal, round, and reactive to light.   Neck:      Thyroid: No thyromegaly.      Vascular: No JVD.      Trachea: No tracheal deviation.   Cardiovascular:      Rate and Rhythm: Regular rhythm. Tachycardia present.      Pulses: Normal pulses.      Heart sounds: Normal heart sounds. No murmur heard.     No friction rub. No gallop.   Pulmonary:      Effort: Pulmonary effort is normal. No respiratory distress.      Breath sounds: Normal breath sounds.   Abdominal:      General: Bowel sounds are normal. There is no distension.      Palpations: Abdomen is soft. There is no mass.      Tenderness: There is no abdominal tenderness. There is no " guarding or rebound.   Musculoskeletal:         General: No tenderness. Normal range of motion.      Cervical back: Normal range of motion and neck supple. No erythema or rigidity.      Right lower leg: No edema.      Left lower leg: No edema.   Lymphadenopathy:      Cervical: No cervical adenopathy.   Skin:     General: Skin is warm and dry.      Capillary Refill: Capillary refill takes less than 2 seconds.      Coloration: Skin is not pale.      Findings: No erythema or rash.   Neurological:      Mental Status: He is alert and oriented to person, place, and time.      Cranial Nerves: No cranial nerve deficit.      Sensory: No sensory deficit.      Motor: Motor function is intact.   Psychiatric:         Mood and Affect: Mood normal. Affect is blunt and flat.         Speech: Speech normal.         Behavior: Behavior is slowed and withdrawn.         Thought Content: Thought content includes suicidal ideation. Thought content does not include homicidal ideation. Thought content does not include suicidal plan.      Comments: Patient seems very fixated on the possibility that he might have injuries but not be able to feel them.         ED Course                Procedures                  Results for orders placed or performed during the hospital encounter of 06/03/23 (from the past 24 hour(s))   CBC with platelets differential    Narrative    The following orders were created for panel order CBC with platelets differential.  Procedure                               Abnormality         Status                     ---------                               -----------         ------                     CBC with platelets and d...[[id number]]                      Final result                 Please view results for these tests on the individual orders.   Comprehensive metabolic panel   Result Value Ref Range    Sodium 141 136 - 145 mmol/L    Potassium 3.8 3.4 - 5.3 mmol/L    Chloride 106 98 - 107 mmol/L    Carbon Dioxide (CO2)  27 22 - 29 mmol/L    Anion Gap 8 7 - 15 mmol/L    Urea Nitrogen 12.5 6.0 - 20.0 mg/dL    Creatinine 1.01 0.67 - 1.17 mg/dL    Calcium 9.2 8.6 - 10.0 mg/dL    Glucose 122 (H) 70 - 99 mg/dL    Alkaline Phosphatase 78 40 - 129 U/L    AST 25 10 - 50 U/L    ALT 42 10 - 50 U/L    Protein Total 7.2 6.4 - 8.3 g/dL    Albumin 4.6 3.5 - 5.2 g/dL    Bilirubin Total 0.8 <=1.2 mg/dL    GFR Estimate >90 >60 mL/min/1.73m2   Magnesium   Result Value Ref Range    Magnesium 2.3 1.7 - 2.3 mg/dL   TSH with free T4 reflex   Result Value Ref Range    TSH 1.47 0.30 - 4.20 uIU/mL   CRP inflammation   Result Value Ref Range    CRP Inflammation <3.00 <5.00 mg/L   Ethyl Alcohol Level   Result Value Ref Range    Alcohol ethyl <0.01 <=0.01 g/dL   CBC with platelets and differential   Result Value Ref Range    WBC Count 9.4 4.0 - 11.0 10e3/uL    RBC Count 4.99 4.40 - 5.90 10e6/uL    Hemoglobin 15.7 13.3 - 17.7 g/dL    Hematocrit 44.3 40.0 - 53.0 %    MCV 89 78 - 100 fL    MCH 31.5 26.5 - 33.0 pg    MCHC 35.4 31.5 - 36.5 g/dL    RDW 11.9 10.0 - 15.0 %    Platelet Count 244 150 - 450 10e3/uL    % Neutrophils 78 %    % Lymphocytes 14 %    % Monocytes 7 %    % Eosinophils 1 %    % Basophils 0 %    % Immature Granulocytes 0 %    NRBCs per 100 WBC 0 <1 /100    Absolute Neutrophils 7.4 1.6 - 8.3 10e3/uL    Absolute Lymphocytes 1.3 0.8 - 5.3 10e3/uL    Absolute Monocytes 0.7 0.0 - 1.3 10e3/uL    Absolute Eosinophils 0.1 0.0 - 0.7 10e3/uL    Absolute Basophils 0.0 0.0 - 0.2 10e3/uL    Absolute Immature Granulocytes 0.0 <=0.4 10e3/uL    Absolute NRBCs 0.0 10e3/uL   [place] Draw    Narrative    The following orders were created for panel order [place] Draw.  Procedure                               Abnormality         Status                     ---------                               -----------         ------                     Extra Blue Top Tube[[id number]]                              Final result               Extra Red Top Tube[[id number]]                                Final result                 Please view results for these tests on the individual orders.   Lactic acid whole blood   Result Value Ref Range    Lactic Acid 1.0 0.7 - 2.0 mmol/L   Extra Blue Top Tube   Result Value Ref Range    Hold Specimen JIC    Extra Red Top Tube   Result Value Ref Range    Hold Specimen JIC    UA with Microscopic reflex to Culture    Specimen: Urine, Midstream   Result Value Ref Range    Color Urine Yellow Colorless, Straw, Light Yellow, Yellow    Appearance Urine Clear Clear    Glucose Urine Negative Negative mg/dL    Bilirubin Urine Negative Negative    Ketones Urine 5 (A) Negative mg/dL    Specific Gravity Urine 1.023 1.003 - 1.035    Blood Urine Negative Negative    pH Urine 6.0 5.0 - 7.0    Protein Albumin Urine Negative Negative mg/dL    Urobilinogen Urine Normal Normal, 2.0 mg/dL    Nitrite Urine Negative Negative    Leukocyte Esterase Urine Negative Negative    Bacteria Urine Few (A) None Seen /HPF    Mucus Urine Present (A) None Seen /LPF    RBC Urine 2 <=2 /HPF    WBC Urine 2 <=5 /HPF    Narrative    Urine Culture not indicated   Urine Drugs of Abuse Screen    Narrative    The following orders were created for panel order Urine Drugs of Abuse Screen.  Procedure                               Abnormality         Status                     ---------                               -----------         ------                     Drug abuse screen 77 uri...[[id number]]  Normal              Final result                 Please view results for these tests on the individual orders.   Drug abuse screen 77 urine (FL, RH, SH)   Result Value Ref Range    Amphetamines Urine Screen Negative Screen Negative    Barbituates Urine Screen Negative Screen Negative    Benzodiazepine Urine Screen Negative Screen Negative    Cannabinoids Urine Screen Negative Screen Negative    Opiates Urine Screen Negative Screen Negative    PCP Urine Screen Negative Screen Negative    Cocaine Urine Screen  Negative Screen Negative       Medications   0.9% sodium chloride BOLUS (0 mLs Intravenous Stopped 6/3/23 1843)     Followed by   sodium chloride 0.9% infusion ( Intravenous $New Bag 6/3/23 1843)   haloperidol lactate (HALDOL) injection 2 mg (2 mg Intravenous $Given 6/3/23 1846)   diphenhydrAMINE (BENADRYL) injection 50 mg (50 mg Intravenous $Given 6/3/23 1848)   LORazepam (ATIVAN) injection 1 mg (1 mg Intramuscular $Given 6/3/23 1850)       Assessments & Plan (with Medical Decision Making)     I have reviewed the nursing notes.    I have reviewed the findings, diagnosis, plan and need for follow up with the patient.  This patient presented to the emergency department with multiple complaints.  He seems somewhat disorganized, tangential and delusional.  He is very fixated on his medical complaints that do not seem to have an organic etiology.  Patient was set up to speak with the mental health  and they met with him.  He denies suicidal ideation and apparently did not follow through on a telehealth therapy visit today.  He is scheduled for an appointment on Wednesday and they recommend that he follow through on this.  Patient is not holdable as he does not represent a danger to himself or others at this point in time.  I did do a medical work-up and there are no signs to suggest any acute infection, inflammatory condition, electrolyte abnormality, thyroid disorder or drug or alcohol intoxication.  Patient was given Haldol here in the emergency department and has been resting comfortably.  When appropriate, he can be discharged from the emergency department and should follow-up with his outpatient mental health resources.        New Prescriptions    No medications on file       Final diagnoses:   Insomnia, unspecified type       6/3/2023   [facility] EMERGENCY DEPT     Demetris Parsons MD  06/04/23 [id number]

## 2023-06-03 NOTE — CONSULTS
"Diagnostic Evaluation Consultation  Crisis Assessment    Patient was assessed: I-Pad  Patient location: [facility] ED  Was a release of information signed: no     Referral Data and Chief Complaint  Wu is a 33 year old, who uses he/him pronouns, and presents to the ED via EMS. Patient is referred to the ED by self. Patient is presenting to the ED for the following concerns: \"my foot didn't feel right so I called an ambulance for it\".      Informed Consent and Assessment Methods     Patient is his own guardian. Writer met with patient and explained the crisis assessment process, including applicable information disclosures and limits to confidentiality, assessed understanding of the process, and obtained consent to proceed with the assessment. Patient was observed to be able to participate in the assessment as evidenced by answering of questions. Assessment methods included conducting a formal interview with patient, review of medical records, collaboration with medical staff, and obtaining relevant collateral information from family and community providers when available..     Over the course of this crisis assessment provided reassurance, offered validation and provided psychoeducation. Patient's response to interventions was neutral.     Summary of Patient Situation     This is pt 5th visit in the past 4 days.    \"My foot didn't feel right so I called an ambulance\". Seen by ED MD, no medical findings.    States he has not slept for over a week and this is increasing sx of panic. Assessed by DEC on 6/1 - sleep issues also reported during this encounter. His anxiety sx appear to be related to his issues of sleep. It would also appear there is likely some health anxiety and/or delusional thoughts re his health.     Brief Psychosocial History     Lives with his mom, dad, sister. Not employed - not in school. Spends his time playing video games, cartoons or anime.     Significant Clinical History     No formal MH hx. "     Review of DEC assessment dated 6/1, pr reports SI - he denies any SI since.      Collateral Information    Review of epic.      Risk Assessment  Reagan Suicide Severity Rating Scale Full Clinical Version: 6/1/23    Reagan Suicide Severity Rating Scale Since Last Contact:  6/3/23  Suicidal Ideation (Since Last Contact)  1. Wish to be Dead (Since Last Contact): No  2. Non-Specific Active Suicidal Thoughts (Since Last Contact): No        C-SSRS Risk (Since Last Contact)  Calculated C-SSRS Risk Score (Since Last Contact): No Risk Indicated    Validity of evaluation is not impacted by presenting factors during interview.   Comments regarding subjective versus objective responses to Reagan tool: none  Environmental or Psychosocial Events: neither working nor attending school and social isolation  Chronic Risk Factors: chronic and ongoing sleep difficulties   Warning Signs: none identified  Protective Factors: strong bond to family unit, community support, or employment, responsibilities and duties to others, including pets and children, lives in a responsibly safe and stable environment and able to access care without barriers  Interpretation of Risk Scoring, Risk Mitigation Interventions and Safety Plan: low risk       Does the patient have thoughts of harming others? No     Is the patient engaging in sexually inappropriate behavior?  no        Current Substance Abuse     Is there recent substance abuse? no     Was a urine drug screen or blood alcohol level obtained: No       Mental Status Exam     Affect: Flat   Appearance: Appropriate    Attention Span/Concentration: Attentive  Eye Contact: Variable   Fund of Knowledge: Appropriate    Language /Speech Content: Fluent   Language /Speech Volume: Normal    Language /Speech Rate/Productions: Slow    Recent Memory: Variable   Remote Memory: Variable   Mood: Anxious    Orientation to Person: Yes    Orientation to Place: Yes   Orientation to Time of Day: Yes     Orientation to Date: Yes    Situation (Do they understand why they are here?): Yes    Psychomotor Behavior: Normal    Thought Content: Clear   Thought Form: Intact      History of commitment: No     Medication    Psychotropic medications: No  Medication changes made in the last two weeks: No       Current Care Team    Primary Care Provider: No  Psychiatrist: No  Therapist: No  : No     CTSS or ARMHS: No  ACT Team: No  Other: No      Diagnosis    300.02 (F41.1) Generalized Anxiety Disorder     Clinical Summary and Substantiation of Recommendations    Pt continues to find it difficult to manage his sleep patterns and sx of anxiety. As it was recommended during DEC assessment on 6/1/23, pt to schedule with PCP for ongoing sleep mgmt, sleep study, physical health complaints. During DEC assessment, an individual therapy appt was made for this date 6/3 which pt unfortunately missed as he states he forgot. He is uncertain about r/s at this time.  He does have a day tx intake scheduled for 6/5/23. He is encouraged to attend and names plan to do so. It would appear there is likely some health anxiety and/or delusional thoughts re his health which can hopefully be assessed further during programmatic care. No acute safety concerns. Pt to discharge to home.  Disposition    Recommended disposition: Programmatic Care: day tx intake 6/5/23       Reviewed case and recommendations with attending provider. Attending Name: Issa AGUIRRE       Attending concurs with disposition: Yes       Patient and/or validated legal guardian concurs with disposition: Yes       Final disposition: Programmatic care: day tx intake 6/5/23.     Outpatient Details (if applicable):   Aftercare plan and appointments placed in the AVS and provided to patient: Yes. Given to patient by ED staff    Was lethal means counseling provided as a part of aftercare planning? No;       Assessment Details    Patient interview started at: 535p and completed  at: 550p.     Total duration spent on the patient case in minutes: 1.50 hrs      CPT code(s) utilized: 29545 - Psychotherapy for Crisis - 60 (30-74*) min       VONDA Lorenzo,  Psychotherapist  [facility] - Triage & Transition Services  Callback: [phone]

## 2023-06-03 NOTE — DISCHARGE INSTRUCTIONS
Please make an appointment to follow up with Your Primary Care Provider as soon as possible.    Olanzapine as directed before bedtime.    Return to the emergency department for any problems.     No

## 2023-06-03 NOTE — ED PROVIDER NOTES
History     Chief Complaint   Patient presents with     Jaw Pain     HPI  Berny Fowler is a 33 year old male who presents to the emergency department with multiple complaints.  He states that he thinks his jaw might be dislocated.  He states that he moves his jaw to the side, felt it pop and is having a hard time moving it now.  He also reports of multiple areas on his back he states that her sore and he thinks that his back is out of alignment.  He also states that he has not slept well in the past several days but did have some Zyprexa the other day that helped him sleep for 5 hours.  He is requesting a prescription for Zyprexa at bedtime.    Allergies:  No Known Allergies    Problem List:    Patient Active Problem List    Diagnosis Date Noted     Impacted cerumen 09/26/2012     Priority: Medium     Scabies 09/26/2012     Priority: Medium     Acne 08/04/2011     Priority: Medium     Flat feet 08/04/2011     Priority: Medium     CARDIOVASCULAR SCREENING; LDL GOAL LESS THAN 160 09/26/2012     Priority: Low        Past Medical History:    Past Medical History:   Diagnosis Date     Undescended testis        Past Surgical History:    Past Surgical History:   Procedure Laterality Date     NO HISTORY OF SURGERY         Family History:    Family History   Problem Relation Age of Onset     C.A.D. Father         MI at age 42 with stent placement     Hypertension Father      Lipids Father      C.A.D. Maternal Grandfather      C.A.D. Paternal Grandfather      Allergies Sister      Cancer Paternal Grandmother         bone      Melanoma No family hx of        Social History:  Marital Status:  Single [1]  Social History     Tobacco Use     Smoking status: Never     Smokeless tobacco: Never   Substance Use Topics     Alcohol use: Yes     Comment: social      Drug use: No        Medications:    OLANZapine (ZYPREXA) 10 MG tablet  hydrOXYzine (VISTARIL) 50 MG capsule          Review of Systems   All other systems reviewed and  are negative.      Physical Exam   BP: 136/89  Pulse: 80  Temp: 98.6  F (37  C)  Resp: 16  Weight: 66.2 kg (146 lb)  SpO2: 98 %      Physical Exam  Vitals and nursing note reviewed.   Constitutional:       General: He is not in acute distress.     Appearance: He is well-developed. He is not ill-appearing, toxic-appearing or diaphoretic.      Comments: Patient is awake and alert, he appears somewhat anxious with a bizarre affect, otherwise in no acute distress.  He is able to speak in complete sentences and is protecting his airway without difficulty.   HENT:      Head: Normocephalic and atraumatic.      Jaw: There is normal jaw occlusion. No trismus, tenderness, swelling, pain on movement or malocclusion.      Mouth/Throat:      Lips: Pink.      Mouth: Mucous membranes are moist.      Pharynx: Oropharynx is clear. No oropharyngeal exudate.   Eyes:      General: Lids are normal. No scleral icterus.     Extraocular Movements: Extraocular movements intact.      Right eye: No nystagmus.      Left eye: No nystagmus.      Conjunctiva/sclera: Conjunctivae normal.      Pupils: Pupils are equal, round, and reactive to light.   Neck:      Thyroid: No thyromegaly.      Vascular: No JVD.      Trachea: No tracheal deviation.   Cardiovascular:      Rate and Rhythm: Normal rate and regular rhythm.      Pulses: Normal pulses.      Heart sounds: Normal heart sounds. No murmur heard.     No friction rub. No gallop.   Pulmonary:      Effort: Pulmonary effort is normal. No respiratory distress.      Breath sounds: Normal breath sounds.   Chest:      Chest wall: No tenderness.   Abdominal:      General: Bowel sounds are normal. There is no distension.      Palpations: Abdomen is soft. There is no mass.      Tenderness: There is no abdominal tenderness. There is no guarding or rebound.   Musculoskeletal:         General: No tenderness. Normal range of motion.      Cervical back: Normal range of motion and neck supple. No erythema,  rigidity or tenderness.      Thoracic back: No tenderness.      Lumbar back: No tenderness.      Right lower leg: No edema.      Left lower leg: No edema.   Lymphadenopathy:      Cervical: No cervical adenopathy.   Skin:     General: Skin is warm and dry.      Capillary Refill: Capillary refill takes less than 2 seconds.      Coloration: Skin is not pale.      Findings: No erythema or rash.   Neurological:      Mental Status: He is alert and oriented to person, place, and time.      Cranial Nerves: No cranial nerve deficit.      Sensory: No sensory deficit.      Motor: Motor function is intact.   Psychiatric:         Mood and Affect: Mood and affect normal.         Speech: Speech normal.         Behavior: Behavior normal.         ED Course              Procedures                  Results for orders placed or performed during the hospital encounter of 06/02/23 (from the past 24 hour(s))   XR Mandible 1/3 Views    Narrative    EXAM: XR MANDIBLE 1/3 VIEWS  LOCATION: [facility]  DATE/TIME: 6/2/2023 8:13 PM CDT    INDICATION: Trauma  COMPARISON: None.      Impression    IMPRESSION: Patient's head is tilted to the left. No mandibular fractures.       Medications - No data to display    Assessments & Plan (with Medical Decision Making)     I have reviewed the nursing notes.    I have reviewed the findings, diagnosis, plan and need for follow up with the patient.  This patient presented to the emergency department with multiple complaints.  I did review multiple past records and appears patient has been having multiple emergency department visits with various vague, pain related complaints as well as some mental health complaints for which he was assessed by the DEC.  No acute suicidal ideation at this visit.  No evidence of dislocation clinically or on x-ray and no evidence of mandibular fracture.  At this point, I am comfortable discharging and prescribing Zyprexa to take at bedtime.  This  "prescription was filled at our pharmacy and patient was discharged.  At time of discharge, he turned around and immediately reregistered to be seen.  I went out to the waiting room to talk to him and ask what we can do to help.  He was sitting comfortably in a chair and then pointed to his legs, bilateral femur area, and stated that he thinks that his legs might be broken.  Patient has been ambulating without difficulty and there is no clinical evidence to suggest bilateral femur fracture.  I explained this to him and he stated \"but would have I cannot feel the pain and they are broken\".  I reassured him that his legs were not broken.  He then stood up and began walking around saying that he did not feel as though he was walking normally and that he needed an x-ray.  I again reassured him that his legs were not broken and that he did not need an x-ray.  I recommended a good night sleep, staying well-hydrated and following up with his primary clinic.  He then followed through on his original discharge.        Discharge Medication List as of 6/2/2023  8:49 PM      START taking these medications    Details   OLANZapine (ZYPREXA) 10 MG tablet Take 1 tablet (10 mg) by mouth At Bedtime, Disp-20 tablet, R-0, E-Prescribe             Final diagnoses:   Insomnia, unspecified type   Jaw pain       6/2/2023   [facility] EMERGENCY DEPT     Demetris Parsons MD  06/03/23 [id number]    "

## 2023-06-03 NOTE — DISCHARGE INSTRUCTIONS
Aftercare Plan  If I am feeling unsafe or I am in a crisis, I will:   Contact my established care providers   Call the National Suicide Prevention Lifeline: 988  Go to the nearest emergency room   Call 911     Warning signs that I or other people might notice when a crisis is developing for me: anxiety    Things I can use or do for distraction:     This technique will take you through your five senses to help remind you of the present. This is a calming technique that can help you get through tough or stressful situations.      5 - LOOK: Look around for 5 things that you can see, and say them out loud. For example, you could say, I see the computer, I see the cup, I see the picture frame.     4 - FEEL: Pay attention to your body and think of 4 things that you can feel, and say them out loud. For example, you could say, I feel my feet warm in my socks, I feel the hair on the back of my neck, or I feel the pillow I am sitting on.     3 - LISTEN: Listen for 3 sounds. It could be the sound of traffic outside, the sound of typing or the sound of your tummy rumbling. Say the three things out loud.     2 - SMELL: Say two things you can smell. If you re allowed to, it s okay to move to another spot and sniff something. If you can t smell anything at the moment or you can t move, then name your 2 favorite smells.     1 - TASTE: Say one thing you can taste. It may be the toothpaste from brushing your teeth, or a mint from after lunch. If you can t taste anything, then say your favorite thing to taste.       Reduce Extreme Emotion  QUICKLY:  Changing Your Body Chemistry      T:  Change your body Temperature to change your autonomic nervous system   ? Use Ice Water to calm yourself down FAST   ? Put your face in a bowl of ice water (this is the best way; have the person keep his/her face in ice water for 30-45 seconds - initial research is showing that the longer s/he can hold her/his face in the water, the better the  response), or   ? Splash ice water on your face, or hold an ice pack on your face      I:  Intensely exercise to calm down a body revved up by emotion   ? Examples: running, walking fast, jumping, playing basketball, weight lifting, swimming, calisthenics, etc.   ? Engage in exercises that DO NOT include violent behaviors. Exercises that utilize violent behaviors tend to function as  behavioral rehearsal,  and rather than calming the person down, may actually  rev  the person up more, increasing the likelihood of violence, and lessening the likelihood that they will  burn off  energy     P:  Progressively relax your muscles   ? Starting with your hands, moving to your forearms, upper arms, shoulders, neck, forehead, eyes, cheeks and lips, tongue and teeth, chest, upper back, stomach, buttocks, thighs, calves, ankles, feet   ? Tense (10 seconds,   of the way), then relax each muscle (all the way)   ? Notice the tension   ? Notice the difference when relaxed (by tensing first, and then relaxing, you are able to get a more thorough relaxation than by simply relaxing)      P: Paced breathing to relax   ? The standard technique is to begin with counting the number of steps one takes for a typical inhale, then counting the steps one takes for a typical exhale, and then lengthening the amount of steps for the exhalation by one or two steps.  OR  ? Repeat this pattern for 1-2 minutes  ? Inhale for four (4) seconds   ? Exhale for six (6) to eight (8) seconds   ? Research demonstrated that one can change one's overall level of anxiety by doing this exercise for even a few minutes per day      Changes I can make to support my mental health and wellness: attend the day treatment intake previously scheduled for Wednesday 6/7/23 --- contact the clinic to schedule an appt with primary care to discuss ongoing sleep issues    People in my life that I can ask for help: mom and dad    [facility] has a mental health crisis team you  "can call 24/7:  [phone]    Other things that are important when I'm in crisis: \"I will get through this, this feeling will not last forever\".      Crisis Lines  Crisis Text Line  Text 729663  You will be connected with a trained live crisis counselor to provide support.    Por gretelanol, texto  ABHINAV a 961830 o texto a 442-AYUDAME en WhatsApp    [employer] (LGBTQ Youth Crisis Line)  6.285.183.5367  text START to 876-924      Outright  Fast Tracker  Linking people to mental health and substance use disorder resources  [employer]     [place] Mental [facility] Warm Line  Peer to peer support  Monday thru Saturday, 12 pm to 10 pm  810.531.9223 or 6.868.851.3680  Text \"Support\" to 61575    [employer] (JOSEFA)  714.878.9923 or 1.888.JOSEFA.HELPS      Mental Health Apps  My3  https://orangutrans.org/    VirtualHopeBox  https://[employer]/apps/virtual-hope-box/      Additional Information  Today you were seen by a licensed mental health professional through Triage and Transition services, [facility] ([facility])  for a crisis assessment in the Emergency Department at [facility].  It is recommended that you follow up with your established providers (psychiatrist, mental health therapist, and/or primary care doctor - as relevant) as soon as possible. Coordinators from [facility] will be calling you in the next 24-48 hours to ensure that you have the resources you need.  You can also contact [facility] coordinators directly at [phone]. You may have been scheduled for or offered an appointment with a mental health provider. [facility] maintains an extensive network of licensed behavioral health providers to connect patients with the services they need.  We do not charge providers a fee to participate in our referral network.  We match patients with providers based on a patient's specific needs, insurance coverage, and location.  Our first effort will be to " refer you to a provider within your care system, and will utilize providers outside your care system as needed.

## 2023-06-04 VITALS
HEIGHT: 69 IN | OXYGEN SATURATION: 100 % | TEMPERATURE: 99.8 F | SYSTOLIC BLOOD PRESSURE: 106 MMHG | HEART RATE: 77 BPM | WEIGHT: 146 LBS | RESPIRATION RATE: 18 BRPM | BODY MASS INDEX: 21.62 KG/M2 | DIASTOLIC BLOOD PRESSURE: 70 MMHG

## 2023-06-04 PROCEDURE — 96361 HYDRATE IV INFUSION ADD-ON: CPT | Performed by: EMERGENCY MEDICINE

## 2023-06-04 ASSESSMENT — ACTIVITIES OF DAILY LIVING (ADL)
ADLS_ACUITY_SCORE: 35

## 2023-06-04 NOTE — ED PROVIDER NOTES
"     Emergency Department Psychiatric Patient Sign-out       Brief HPI:  This is a 33 year old male signed out to me by Dr. Parsons .  See initial ED Provider note for details of the presentation.       Pending studies include none.      The patient is not on a hold.        Medications   0.9% sodium chloride BOLUS (0 mLs Intravenous Stopped 6/3/23 1843)     Followed by   sodium chloride 0.9% infusion ( Intravenous $New Bag 6/3/23 1843)   haloperidol lactate (HALDOL) injection 2 mg (2 mg Intravenous $Given 6/3/23 1846)   diphenhydrAMINE (BENADRYL) injection 50 mg (50 mg Intravenous $Given 6/3/23 1848)   LORazepam (ATIVAN) injection 1 mg (1 mg Intramuscular $Given 6/3/23 1850)       Exam:   Patient Vitals for the past 24 hrs:   BP Temp Temp src Pulse Resp SpO2 Height Weight   06/03/23 2232 -- -- -- -- -- 98 % -- --   06/03/23 2230 134/82 -- -- 89 -- 98 % -- --   06/03/23 2202 121/82 -- -- 94 -- 100 % -- --   06/03/23 2132 116/69 -- -- 96 -- 100 % -- --   06/03/23 2102 -- -- -- -- -- 94 % -- --   06/03/23 2100 93/48 -- -- 77 -- 94 % -- --   06/03/23 2032 -- -- -- -- -- 100 % -- --   06/03/23 2030 (!) 142/80 -- -- 105 -- 94 % -- --   06/03/23 2000 96/52 -- -- 78 -- 94 % -- --   06/03/23 1945 -- -- -- -- -- 96 % -- --   06/03/23 1930 111/75 -- -- 86 -- 93 % -- --   06/03/23 1927 -- -- -- -- -- 93 % -- --   06/03/23 1900 -- -- -- 88 -- 99 % -- --   06/03/23 1857 126/84 -- -- -- -- 99 % -- --   06/03/23 1845 (!) 142/88 -- -- 84 -- 91 % -- --   06/03/23 1827 -- -- -- -- -- 99 % -- --   06/03/23 1757 -- -- -- -- -- 98 % -- --   06/03/23 1727 -- -- -- -- -- 98 % -- --   06/03/23 1715 -- -- -- -- -- 98 % -- --   06/03/23 1702 (!) 159/99 99.8  F (37.7  C) Oral 112 16 96 % 1.753 m (5' 9\") 66.2 kg (146 lb)   06/03/23 1657 (!) 159/99 -- -- 101 -- 93 % -- --         ED Course:    Sleeping here. No medical issues. Was evaluated by DEC. Safe for discharge home. Has follow up with virtual apt Wednesday.      Impression:    ICD-10-CM  " "  1. Insomnia, unspecified type  G47.00           Plan:    Awaiting him to wake with plan for discharge home when he is awake.     RESULTS:   Results for orders placed or performed during the hospital encounter of 06/03/23 (from the past 24 hour(s))   Diagn[facility] Evaluation [facility] (DEC) Assessment Consult Order:     Status: None ()    Collection Time: 06/03/23  5:03 PM    Narrative    Ely Muñiz, [facility]     6/3/2023  7:42 PM  Diagnostic Evaluation Consultation  Crisis Assessment    Patient was assessed: I-Pad  Patient location: [facility] ED  Was a release of information signed: no     Referral Data and Chief Complaint  Wu is a 33 year old, who uses he/him pronouns, and presents   to the ED via EMS. Patient is referred to the ED by self. Patient   is presenting to the ED for the following concerns: \"my foot   didn't feel right so I called an ambulance for it\".      Informed Consent and Assessment Methods     Patient is his own guardian. Writer met with patient and   explained the crisis assessment process, including applicable   information disclosures and limits to confidentiality, assessed   understanding of the process, and obtained consent to proceed   with the assessment. Patient was observed to be able to   participate in the assessment as evidenced by answering of   questions. Assessment methods included conducting a formal   interview with patient, review of medical records, collaboration   with medical staff, and obtaining relevant collateral information   from family and community providers when available..     Over the course of this crisis assessment provided reassurance,   offered validation and provided psychoeducation. Patient's   response to interventions was neutral.     Summary of Patient Situation     This is pt 5th visit in the past 4 days.    \"My foot didn't feel right so I called an ambulance\". Seen by ED   MD, no medical findings.    States he has not slept for over a week and this is " increasing sx   of panic. Assessed by DEC on 6/1 - sleep issues also reported   during this encounter. His anxiety sx appear to be related to his   issues of sleep. It would also appear there is likely some health   anxiety and/or delusional thoughts re his health.     Brief Psychosocial History     Lives with his mom, dad, sister. Not employed - not in school.   Spends his time playing video games, cartoons or anime.     Significant Clinical History     No formal MH hx.     Review of DEC assessment dated 6/1, pr reports SI - he denies any   SI since.      Collateral Information    Review of epic.      Risk Assessment  Meridian Suicide Severity Rating Scale Full Clinical Version:   6/1/23    Meridian Suicide Severity Rating Scale Since Last Contact:    6/3/23  Suicidal Ideation (Since Last Contact)  1. Wish to be Dead (Since Last Contact): No  2. Non-Specific Active Suicidal Thoughts (Since Last Contact): No        C-SSRS Risk (Since Last Contact)  Calculated C-SSRS Risk Score (Since Last Contact): No Risk   Indicated    Validity of evaluation is not impacted by presenting factors   during interview.   Comments regarding subjective versus objective responses to   Meridian tool: none  Environmental or Psychosocial Events: neither working nor   attending school and social isolation  Chronic Risk Factors: chronic and ongoing sleep difficulties   Warning Signs: none identified  Protective Factors: strong bond to family unit, community   support, or employment, responsibilities and duties to others,   including pets and children, lives in a responsibly safe and   stable environment and able to access care without barriers  Interpretation of Risk Scoring, Risk Mitigation Interventions and   Safety Plan: low risk       Does the patient have thoughts of harming others? No     Is the patient engaging in sexually inappropriate behavior?  no        Current Substance Abuse     Is there recent substance abuse? no     Was a urine  drug screen or blood alcohol level obtained: No       Mental Status Exam     Affect: Flat   Appearance: Appropriate    Attention Span/Concentration: Attentive  Eye Contact: Variable   Fund of Knowledge: Appropriate    Language /Speech Content: Fluent   Language /Speech Volume: Normal    Language /Speech Rate/Productions: Slow    Recent Memory: Variable   Remote Memory: Variable   Mood: Anxious    Orientation to Person: Yes    Orientation to Place: Yes   Orientation to Time of Day: Yes    Orientation to Date: Yes    Situation (Do they understand why they are here?): Yes    Psychomotor Behavior: Normal    Thought Content: Clear   Thought Form: Intact      History of commitment: No     Medication    Psychotropic medications: No  Medication changes made in the last two weeks: No       Current Care Team    Primary Care Provider: No  Psychiatrist: No  Therapist: No  : No     CTSS or ARMHS: No  ACT Team: No  Other: No      Diagnosis    300.02 (F41.1) Generalized Anxiety Disorder     Clinical Summary and Substantiation of Recommendations    Pt continues to find it difficult to manage his sleep patterns   and sx of anxiety. As it was recommended during DEC assessment on   6/1/23, pt to schedule with PCP for ongoing sleep mgmt, sleep   study, physical health complaints. During DEC assessment, an   individual therapy appt was made for this date 6/3 which pt   unfortunately missed as he states he forgot. He is uncertain   about r/s at this time.  He does have a day tx intake scheduled   for 6/5/23. He is encouraged to attend and names plan to do so.   It would appear there is likely some health anxiety and/or   delusional thoughts re his health which can hopefully be assessed   further during programmatic care. No acute safety concerns. Pt to   discharge to home.  Disposition    Recommended disposition: Programmatic Care: day tx intake 6/5/23         Reviewed case and recommendations with attending provider.    Attending Name: Issa AGUIRRE       Attending concurs with disposition: Yes       Patient and/or validated legal guardian concurs with disposition:   Yes       Final disposition: Programmatic care: day tx intake 6/5/23.     Outpatient Details (if applicable):   Aftercare plan and appointments placed in the AVS and provided to   patient: Yes. Given to patient by ED staff    Was lethal means counseling provided as a part of aftercare   planning? No;       Assessment Details    Patient interview started at: 535p and completed at: 550p.     Total duration spent on the patient case in minutes: 1.50 hrs      CPT code(s) utilized: 20081 - Psychotherapy for Crisis - 60   (30-74*) min       Ely Muñiz [facility],  Psychotherapist  DEC - Triage & Transition Services  Callback: [phone]                 CBC with platelets differential     Status: None    Collection Time: 06/03/23  5:19 PM    Narrative    The following orders were created for panel order CBC with platelets differential.  Procedure                               Abnormality         Status                     ---------                               -----------         ------                     CBC with platelets and d...[[id number]]                      Final result                 Please view results for these tests on the individual orders.   Comprehensive metabolic panel     Status: Abnormal    Collection Time: 06/03/23  5:19 PM   Result Value Ref Range    Sodium 141 136 - 145 mmol/L    Potassium 3.8 3.4 - 5.3 mmol/L    Chloride 106 98 - 107 mmol/L    Carbon Dioxide (CO2) 27 22 - 29 mmol/L    Anion Gap 8 7 - 15 mmol/L    Urea Nitrogen 12.5 6.0 - 20.0 mg/dL    Creatinine 1.01 0.67 - 1.17 mg/dL    Calcium 9.2 8.6 - 10.0 mg/dL    Glucose 122 (H) 70 - 99 mg/dL    Alkaline Phosphatase 78 40 - 129 U/L    AST 25 10 - 50 U/L    ALT 42 10 - 50 U/L    Protein Total 7.2 6.4 - 8.3 g/dL    Albumin 4.6 3.5 - 5.2 g/dL    Bilirubin Total 0.8 <=1.2 mg/dL    GFR Estimate  >90 >60 mL/min/1.73m2   Magnesium     Status: Normal    Collection Time: 06/03/23  5:19 PM   Result Value Ref Range    Magnesium 2.3 1.7 - 2.3 mg/dL   TSH with free T4 reflex     Status: Normal    Collection Time: 06/03/23  5:19 PM   Result Value Ref Range    TSH 1.47 0.30 - 4.20 uIU/mL   CRP inflammation     Status: Normal    Collection Time: 06/03/23  5:19 PM   Result Value Ref Range    CRP Inflammation <3.00 <5.00 mg/L   Ethyl Alcohol Level     Status: Normal    Collection Time: 06/03/23  5:19 PM   Result Value Ref Range    Alcohol ethyl <0.01 <=0.01 g/dL   CBC with platelets and differential     Status: None    Collection Time: 06/03/23  5:19 PM   Result Value Ref Range    WBC Count 9.4 4.0 - 11.0 10e3/uL    RBC Count 4.99 4.40 - 5.90 10e6/uL    Hemoglobin 15.7 13.3 - 17.7 g/dL    Hematocrit 44.3 40.0 - 53.0 %    MCV 89 78 - 100 fL    MCH 31.5 26.5 - 33.0 pg    MCHC 35.4 31.5 - 36.5 g/dL    RDW 11.9 10.0 - 15.0 %    Platelet Count 244 150 - 450 10e3/uL    % Neutrophils 78 %    % Lymphocytes 14 %    % Monocytes 7 %    % Eosinophils 1 %    % Basophils 0 %    % Immature Granulocytes 0 %    NRBCs per 100 WBC 0 <1 /100    Absolute Neutrophils 7.4 1.6 - 8.3 10e3/uL    Absolute Lymphocytes 1.3 0.8 - 5.3 10e3/uL    Absolute Monocytes 0.7 0.0 - 1.3 10e3/uL    Absolute Eosinophils 0.1 0.0 - 0.7 10e3/uL    Absolute Basophils 0.0 0.0 - 0.2 10e3/uL    Absolute Immature Granulocytes 0.0 <=0.4 10e3/uL    Absolute NRBCs 0.0 10e3/uL   [place] Draw     Status: None    Collection Time: 06/03/23  5:22 PM    Narrative    The following orders were created for panel order [place] Draw.  Procedure                               Abnormality         Status                     ---------                               -----------         ------                     Extra Blue Top Tube[[id number]]                              Final result               Extra Red Top Tube[[id number]]                               Final result                  Please view results for these tests on the individual orders.   Lactic acid whole blood     Status: Normal    Collection Time: 06/03/23  5:22 PM   Result Value Ref Range    Lactic Acid 1.0 0.7 - 2.0 mmol/L   Extra Blue Top Tube     Status: None    Collection Time: 06/03/23  5:22 PM   Result Value Ref Range    Hold Specimen JIC    Extra Red Top Tube     Status: None    Collection Time: 06/03/23  5:22 PM   Result Value Ref Range    Hold Specimen JIC    UA with Microscopic reflex to Culture     Status: Abnormal    Collection Time: 06/03/23  6:54 PM    Specimen: Urine, Midstream   Result Value Ref Range    Color Urine Yellow Colorless, Straw, Light Yellow, Yellow    Appearance Urine Clear Clear    Glucose Urine Negative Negative mg/dL    Bilirubin Urine Negative Negative    Ketones Urine 5 (A) Negative mg/dL    Specific Gravity Urine 1.023 1.003 - 1.035    Blood Urine Negative Negative    pH Urine 6.0 5.0 - 7.0    Protein Albumin Urine Negative Negative mg/dL    Urobilinogen Urine Normal Normal, 2.0 mg/dL    Nitrite Urine Negative Negative    Leukocyte Esterase Urine Negative Negative    Bacteria Urine Few (A) None Seen /HPF    Mucus Urine Present (A) None Seen /LPF    RBC Urine 2 <=2 /HPF    WBC Urine 2 <=5 /HPF    Narrative    Urine Culture not indicated   Urine Drugs of Abuse Screen     Status: Normal    Collection Time: 06/03/23  6:54 PM    Narrative    The following orders were created for panel order Urine Drugs of Abuse Screen.  Procedure                               Abnormality         Status                     ---------                               -----------         ------                     Drug abuse screen 77 uri...[[id number]]  Normal              Final result                 Please view results for these tests on the individual orders.   Drug abuse screen 77 urine (FL, RH, SH)     Status: Normal    Collection Time: 06/03/23  6:54 PM   Result Value Ref Range    Amphetamines Urine Screen Negative  Screen Negative    Barbituates Urine Screen Negative Screen Negative    Benzodiazepine Urine Screen Negative Screen Negative    Cannabinoids Urine Screen Negative Screen Negative    Opiates Urine Screen Negative Screen Negative    PCP Urine Screen Negative Screen Negative    Cocaine Urine Screen Negative Screen Negative         MD Brian Crouch Christopher James, MD  06/04/23 0329

## 2023-06-22 ENCOUNTER — OFFICE VISIT (OUTPATIENT)
Dept: FAMILY MEDICINE | Facility: CLINIC | Age: 33
End: 2023-06-22
Payer: COMMERCIAL

## 2023-06-22 VITALS
WEIGHT: 149.9 LBS | HEIGHT: 69 IN | TEMPERATURE: 98.6 F | HEART RATE: 104 BPM | SYSTOLIC BLOOD PRESSURE: 126 MMHG | RESPIRATION RATE: 16 BRPM | DIASTOLIC BLOOD PRESSURE: 84 MMHG | BODY MASS INDEX: 22.2 KG/M2 | OXYGEN SATURATION: 98 %

## 2023-06-22 DIAGNOSIS — Z00.00 ANNUAL PHYSICAL EXAM: Primary | ICD-10-CM

## 2023-06-22 DIAGNOSIS — R09.81 CONGESTION OF PARANASAL SINUS: ICD-10-CM

## 2023-06-22 DIAGNOSIS — E78.5 HYPERLIPIDEMIA LDL GOAL <100: ICD-10-CM

## 2023-06-22 DIAGNOSIS — G47.00 INSOMNIA, UNSPECIFIED TYPE: ICD-10-CM

## 2023-06-22 LAB
ALBUMIN SERPL BCG-MCNC: 4.6 G/DL (ref 3.5–5.2)
ALP SERPL-CCNC: 76 U/L (ref 40–129)
ALT SERPL W P-5'-P-CCNC: 92 U/L (ref 0–70)
ANION GAP SERPL CALCULATED.3IONS-SCNC: 8 MMOL/L (ref 7–15)
AST SERPL W P-5'-P-CCNC: 36 U/L (ref 0–45)
BILIRUB SERPL-MCNC: 0.6 MG/DL
BUN SERPL-MCNC: 12.8 MG/DL (ref 6–20)
CALCIUM SERPL-MCNC: 9.2 MG/DL (ref 8.6–10)
CHLORIDE SERPL-SCNC: 105 MMOL/L (ref 98–107)
CHOLEST SERPL-MCNC: 220 MG/DL
CREAT SERPL-MCNC: 0.94 MG/DL (ref 0.67–1.17)
DEPRECATED HCO3 PLAS-SCNC: 29 MMOL/L (ref 22–29)
GFR SERPL CREATININE-BSD FRML MDRD: >90 ML/MIN/1.73M2
GLUCOSE SERPL-MCNC: 100 MG/DL (ref 70–99)
HBA1C MFR BLD: 5.1 % (ref 0–5.6)
HDLC SERPL-MCNC: 45 MG/DL
LDLC SERPL CALC-MCNC: 132 MG/DL
NONHDLC SERPL-MCNC: 175 MG/DL
POTASSIUM SERPL-SCNC: 4 MMOL/L (ref 3.4–5.3)
PROT SERPL-MCNC: 7.1 G/DL (ref 6.4–8.3)
SODIUM SERPL-SCNC: 142 MMOL/L (ref 136–145)
TRIGL SERPL-MCNC: 215 MG/DL

## 2023-06-22 PROCEDURE — 80053 COMPREHEN METABOLIC PANEL: CPT | Performed by: NURSE PRACTITIONER

## 2023-06-22 PROCEDURE — 83036 HEMOGLOBIN GLYCOSYLATED A1C: CPT | Performed by: NURSE PRACTITIONER

## 2023-06-22 PROCEDURE — 99395 PREV VISIT EST AGE 18-39: CPT | Performed by: NURSE PRACTITIONER

## 2023-06-22 PROCEDURE — 36415 COLL VENOUS BLD VENIPUNCTURE: CPT | Performed by: NURSE PRACTITIONER

## 2023-06-22 PROCEDURE — 80061 LIPID PANEL: CPT | Performed by: NURSE PRACTITIONER

## 2023-06-22 PROCEDURE — 99213 OFFICE O/P EST LOW 20 MIN: CPT | Mod: 25 | Performed by: NURSE PRACTITIONER

## 2023-06-22 RX ORDER — OLANZAPINE 10 MG/1
10 TABLET ORAL AT BEDTIME
Qty: 30 TABLET | Refills: 2 | Status: SHIPPED | OUTPATIENT
Start: 2023-06-22

## 2023-06-22 RX ORDER — FLUTICASONE PROPIONATE 50 MCG
1 SPRAY, SUSPENSION (ML) NASAL DAILY
Qty: 16 G | Refills: 1 | Status: SHIPPED | OUTPATIENT
Start: 2023-06-22

## 2023-06-22 ASSESSMENT — ENCOUNTER SYMPTOMS
FEVER: 0
DYSURIA: 0
COUGH: 0
HEMATURIA: 0
EYE PAIN: 0
HEMATOCHEZIA: 0
PALPITATIONS: 0
WEAKNESS: 0
FREQUENCY: 0
CHILLS: 0
NAUSEA: 0
HEADACHES: 0
ARTHRALGIAS: 0
ABDOMINAL PAIN: 0
HEARTBURN: 0
JOINT SWELLING: 0
SORE THROAT: 0
PARESTHESIAS: 0
DIARRHEA: 0
NERVOUS/ANXIOUS: 0
MYALGIAS: 0
SHORTNESS OF BREATH: 0
CONSTIPATION: 0
DIZZINESS: 0

## 2023-06-22 ASSESSMENT — PAIN SCALES - GENERAL: PAINLEVEL: NO PAIN (0)

## 2023-06-22 NOTE — LETTER
June 23, 2023      [address]  [address]   [place] [address]        Dear ,    We are writing to inform you of your test results.    1. Cholesterol level borderline high, recommend to follow low fat diet, exercise and recheck lipid panel in 3 months, if no improvement will plan to change Zyprexa to different medication.  2. A1C test normal, patient does not have diabetes  3. Kidney function normal  4. Liver slightly abnormal this might be due to poor diet, recommend to follow low fat diet, eat more fruits and vegetables and exercise daily. Avoid alcohol.    Resulted Orders   Hemoglobin A1c   Result Value Ref Range    Hemoglobin A1C 5.1 0.0 - 5.6 %      Comment:      Normal <5.7%   Prediabetes 5.7-6.4%    Diabetes 6.5% or higher     Note: Adopted from ADA consensus guidelines.   Lipid panel reflex to direct LDL Non-fasting   Result Value Ref Range    Cholesterol 220 (H) <200 mg/dL    Triglycerides 215 (H) <150 mg/dL    Direct Measure HDL 45 >=40 mg/dL    LDL Cholesterol Calculated 132 (H) <=100 mg/dL    Non HDL Cholesterol 175 (H) <130 mg/dL    Narrative    Cholesterol  Desirable:  <200 mg/dL    Triglycerides  Normal:  Less than 150 mg/dL  Borderline High:  150-199 mg/dL  High:  200-499 mg/dL  Very High:  Greater than or equal to 500 mg/dL    Direct Measure HDL  Female:  Greater than or equal to 50 mg/dL   Male:  Greater than or equal to 40 mg/dL    LDL Cholesterol  Desirable:  <100mg/dL  Above Desirable:  100-129 mg/dL   Borderline High:  130-159 mg/dL   High:  160-189 mg/dL   Very High:  >= 190 mg/dL    Non HDL Cholesterol  Desirable:  130 mg/dL  Above Desirable:  130-159 mg/dL  Borderline High:  160-189 mg/dL  High:  190-219 mg/dL  Very High:  Greater than or equal to 220 mg/dL   Comprehensive metabolic panel (BMP + Alb, Alk Phos, ALT, AST, Total. Bili, TP)   Result Value Ref Range    Sodium 142 136 - 145 mmol/L    Potassium 4.0 3.4 - 5.3 mmol/L    Chloride 105 98 - 107 mmol/L    Carbon  Dioxide (CO2) 29 22 - 29 mmol/L    Anion Gap 8 7 - 15 mmol/L    Urea Nitrogen 12.8 6.0 - 20.0 mg/dL    Creatinine 0.94 0.67 - 1.17 mg/dL    Calcium 9.2 8.6 - 10.0 mg/dL    Glucose 100 (H) 70 - 99 mg/dL    Alkaline Phosphatase 76 40 - 129 U/L    AST 36 0 - 45 U/L      Comment:      Reference intervals for this test were updated on 6/12/2023 to more accurately reflect our healthy population. There may be differences in the flagging of prior results with similar values performed with this method. Interpretation of those prior results can be made in the context of the updated reference intervals.    ALT 92 (H) 0 - 70 U/L      Comment:      Reference intervals for this test were updated on 6/12/2023 to more accurately reflect our healthy population. There may be differences in the flagging of prior results with similar values performed with this method. Interpretation of those prior results can be made in the context of the updated reference intervals.      Protein Total 7.1 6.4 - 8.3 g/dL    Albumin 4.6 3.5 - 5.2 g/dL    Bilirubin Total 0.6 <=1.2 mg/dL    GFR Estimate >90 >60 mL/min/1.73m2       If you have any questions or concerns, please call the clinic at the number listed above.       Sincerely,    KALINA Murrieta CNP

## 2024-07-18 NOTE — PROGRESS NOTES
Melva Saleh is an extremely pleasant 70 year old year old female patient I was asked to see by Dr. Hawthorne for for rash on hands and scalp.  .   Patient states this has been present for a while.  Patient reports the following symptoms:  itching.  Patient reports the following previous treatments topicals on hands.  .  Patient reports the following modifying factors none.  Associated symptoms: none.  Patient has no other skin complaints today.  Remainder of the HPI, Meds, PMH, Allergies, FH, and SH was reviewed in chart.      Past Medical History:   Diagnosis Date    BALBIR (generalized anxiety disorder)        Past Surgical History:   Procedure Laterality Date    COLONOSCOPY N/A 10/1/2018    Procedure: COLONOSCOPY;  colonoscopy;  Surgeon: Kendra Olson MD;  Location:  GI    RETINAL REATTACHMENT Right 2020    TONSILLECTOMY & ADENOIDECTOMY  as a child        Family History   Problem Relation Age of Onset    Skin Cancer Mother     Coronary Artery Disease Father         s/p PCI (stent) later in life    Breast Cancer Paternal Grandmother     Myocardial Infarction Paternal Grandfather         later in life    Breast Cancer Maternal Aunt     Diabetes Type 2  Paternal Aunt     Cerebrovascular Disease No family hx of     Ovarian Cancer No family hx of     Colon Cancer No family hx of        Social History     Socioeconomic History    Marital status:      Spouse name: Not on file    Number of children: Not on file    Years of education: Not on file    Highest education level: Not on file   Occupational History    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       6/22/2023     4:08 PM   Additional Questions   Roomed by Erin         6/22/2023     4:08 PM   Patient Reported Additional Medications   Patient reports taking the following new medications none     Healthy Habits:     Getting at least 3 servings of Calcium per day:  NO    Bi-annual eye exam:  Yes    Dental care twice a year:  NO    Sleep apnea or symptoms of sleep apnea:  None    Diet:  Regular (no restrictions)    Frequency of exercise:  1 day/week    Duration of exercise:  Less than 15 minutes    Taking medications regularly:  Yes    Medication side effects:  None    PHQ-2 Total Score: 0    Additional concerns today:  Yes (Wants to discuss olanzapine and the side effects from this medication. )      ED/UC Followup:    Facility:  [facility]   Date of visit: 6/3/23  Reason for visit: Insomnia   Current Status: Was given hydyoxyzine for insomnia, he states he stopped taking it because it made his anxious.       Today's PHQ-2 Score:       6/22/2023     4:08 PM   PHQ-2 ( 1999 [employer])   Q1: Little interest or pleasure in doing things 0   Q2: Feeling down, depressed or hopeless 0   PHQ-2 Score 0   Q1: Little interest or pleasure in doing things Not at all   Q2: Feeling down, depressed or hopeless Not at all   PHQ-2 Score 0       Have you ever done Advance Care Planning? (For example, a Health Directive, POLST, or a discussion with a medical provider or your loved ones about your wishes): No, advance care planning information given to patient to review.  Patient declined advance care planning discussion at this time.    Social History     Tobacco Use     Smoking status: Never     Smokeless tobacco: Never   Substance Use Topics     Alcohol use: Yes     Comment: social            6/22/2023     4:07 PM   Alcohol Use   Prescreen: >3 drinks/day or >7 drinks/week? Not Applicable     Last PSA: No results found for: PSA    Reviewed orders  MI or angioplasty before 65F 55M? No     Service Not Asked    Blood Transfusions Not Asked    Caffeine Concern Not Asked    Occupational Exposure Not Asked    Hobby Hazards Not Asked    Sleep Concern Not Asked    Stress Concern Not Asked    Weight Concern Not Asked    Special Diet Not Asked    Back Care Not Asked    Exercise Not Asked    Bike Helmet Yes    Seat Belt Yes    Self-Exams Not Asked   Social History Narrative    .    3 adult children - live in area.    6 grandchildren (as of 2024).    No formal exercise.      Social Determinants of Health     Financial Resource Strain: Low Risk  (1/4/2024)    Financial Resource Strain     Within the past 12 months, have you or your family members you live with been unable to get utilities (heat, electricity) when it was really needed?: No   Food Insecurity: Low Risk  (1/4/2024)    Food Insecurity     Within the past 12 months, did you worry that your food would run out before you got money to buy more?: No     Within the past 12 months, did the food you bought just not last and you didn t have money to get more?: No   Transportation Needs: Low Risk  (1/4/2024)    Transportation Needs     Within the past 12 months, has lack of transportation kept you from medical appointments, getting your medicines, non-medical meetings or appointments, work, or from getting things that you need?: No   Physical Activity: Insufficiently Active (11/8/2021)    Exercise Vital Sign     Days of Exercise per Week: 1 day     Minutes of Exercise per Session: 60 min   Stress: No Stress Concern Present (11/8/2021)    [employer] [place] [employer] - Occupational Stress Questionnaire     Feeling of Stress : Only a little   Social Connections: Socially Integrated (11/8/2021)    Social Connection and Isolation Panel [NHANES]     Frequency of Communication with Friends and Family: Three times a week     Frequency of Social Gatherings with Friends and Family: Once a week      with patient. Reviewed health maintenance and updated orders accordingly - Yes  Lab work is in process  Labs reviewed in EPIC  BP Readings from Last 3 Encounters:   06/22/23 126/84   06/04/23 106/70   06/02/23 136/89    Wt Readings from Last 3 Encounters:   06/22/23 68 kg (149 lb 14.4 oz)   06/03/23 66.2 kg (146 lb)   06/02/23 66.2 kg (146 lb)                    Reviewed and updated as needed this visit by clinical staff   Tobacco  Allergies  Meds              Reviewed and updated as needed this visit by Provider                     Review of Systems   Constitutional: Negative for chills and fever.   HENT: Negative for congestion, ear pain, hearing loss and sore throat.    Eyes: Negative for pain and visual disturbance.   Respiratory: Negative for cough and shortness of breath.    Cardiovascular: Negative for chest pain, palpitations and peripheral edema.   Gastrointestinal: Negative for abdominal pain, constipation, diarrhea, heartburn, hematochezia and nausea.   Genitourinary: Negative for dysuria, frequency, genital sores, hematuria, impotence, penile discharge and urgency.   Musculoskeletal: Negative for arthralgias, joint swelling and myalgias.   Skin: Negative for rash.   Neurological: Negative for dizziness, weakness, headaches and paresthesias.   Psychiatric/Behavioral: Negative for mood changes. The patient is not nervous/anxious.      CONSTITUTIONAL: NEGATIVE for fever, chills, change in weight  INTEGUMENTARY/SKIN: NEGATIVE for worrisome rashes, moles or lesions  EYES: NEGATIVE for vision changes or irritation  ENT: NEGATIVE for ear, mouth and throat problems  RESP: NEGATIVE for significant cough or SOB  CV: NEGATIVE for chest pain, palpitations or peripheral edema  GI: NEGATIVE for nausea, abdominal pain, heartburn, or change in bowel habits   male: negative for dysuria, hematuria, decreased urinary stream, erectile dysfunction, urethral discharge  MUSCULOSKELETAL: NEGATIVE for significant  "arthralgias or myalgia  NEURO: NEGATIVE for weakness, dizziness or paresthesias  PSYCHIATRIC: NEGATIVE for changes in mood or affect    OBJECTIVE:   /84 (BP Location: Left arm, Patient Position: Sitting, Cuff Size: Adult Regular)   Pulse 104   Temp 98.6  F (37  C) (Tympanic)   Resp 16   Ht 1.753 m (5' 9\")   Wt 68 kg (149 lb 14.4 oz)   SpO2 98%   BMI 22.14 kg/m      Physical Exam  GENERAL: healthy, alert and no distress  EYES: Eyes grossly normal to inspection, PERRL and conjunctivae and sclerae normal  HENT: ear canals and TM's normal, nose and mouth without ulcers or lesions  NECK: no adenopathy, no asymmetry, masses, or scars and thyroid normal to palpation  RESP: lungs clear to auscultation - no rales, rhonchi or wheezes  CV: regular rate and rhythm, normal S1 S2, no S3 or S4, no murmur, click or rub, no peripheral edema and peripheral pulses strong  MS: no gross musculoskeletal defects noted, no edema  SKIN: no suspicious lesions or rashes  NEURO: Normal strength and tone, mentation intact and speech normal  PSYCH: mentation appears normal, affect normal/bright    Diagnostic Test Results:  Labs reviewed in Epic  Results for orders placed or performed in visit on 06/22/23 (from the past 24 hour(s))   Hemoglobin A1c   Result Value Ref Range    Hemoglobin A1C 5.1 0.0 - 5.6 %   Lipid panel reflex to direct LDL Non-fasting   Result Value Ref Range    Cholesterol 220 (H) <200 mg/dL    Triglycerides 215 (H) <150 mg/dL    Direct Measure HDL 45 >=40 mg/dL    LDL Cholesterol Calculated 132 (H) <=100 mg/dL    Non HDL Cholesterol 175 (H) <130 mg/dL    Narrative    Cholesterol  Desirable:  <200 mg/dL    Triglycerides  Normal:  Less than 150 mg/dL  Borderline High:  150-199 mg/dL  High:  200-499 mg/dL  Very High:  Greater than or equal to 500 mg/dL    Direct Measure HDL  Female:  Greater than or equal to 50 mg/dL   Male:  Greater than or equal to 40 mg/dL    LDL Cholesterol  Desirable:  <100mg/dL  Above " Attends [employer] Services: More than 4 times per year     Active Member of Clubs or Organizations: Yes     Attends Club or Organization Meetings: Not on file     Marital Status:    Interpersonal Safety: Low Risk  (1/4/2024)    Interpersonal Safety     Do you feel physically and emotionally safe where you currently live?: Yes     Within the past 12 months, have you been hit, slapped, kicked or otherwise physically hurt by someone?: No     Within the past 12 months, have you been humiliated or emotionally abused in other ways by your partner or ex-partner?: No   Housing Stability: Low Risk  (1/4/2024)    Housing Stability     Do you have housing? : Yes     Are you worried about losing your housing?: No       Outpatient Encounter Medications as of 7/18/2024   Medication Sig Dispense Refill    clobetasol propionate (CLOBEX) 0.05 % external shampoo Lather into scalp daily as needed. Leave on for 5 minutes. Rinse. May use conditioner afterwards. 118 mL 1    fluocinonide (LIDEX) 0.05 % external cream Apply topically 2 times daily 30 g 0     No facility-administered encounter medications on file as of 7/18/2024.             O:   NAD, WDWN, Alert & Oriented, Mood & Affect wnl, Vitals stable   General appearance normal   Vitals stable   Alert, oriented and in no acute distress     Post scalp scaly red plaque  BL hands fissured red scaly plaques      Eyes: Conjunctivae/lids:Normal     ENT: Lips, mucosa: normal    MSK:Normal    Cardiovascular: peripheral edema none    Pulm: Breathing Normal    Neuro/Psych: Orientation:Alert and Orientedx3 ; Mood/Affect:normal       A/P:  Eczema v Psoriasis  Pathophysiology discussed with pateint   Topicals, orals, injections discussed with patient   Betamethasone for hands  Olux foam for scalp   Return to clinic 3 months  It was a pleasure speaking to Melva Saleh today.  Previous clinic notes and pertinent laboratory tests were reviewed prior to Melva Saleh's visit.  Skin care regimen  Desirable:  100-129 mg/dL   Borderline High:  130-159 mg/dL   High:  160-189 mg/dL   Very High:  >= 190 mg/dL    Non HDL Cholesterol  Desirable:  130 mg/dL  Above Desirable:  130-159 mg/dL  Borderline High:  160-189 mg/dL  High:  190-219 mg/dL  Very High:  Greater than or equal to 220 mg/dL   Comprehensive metabolic panel (BMP + Alb, Alk Phos, ALT, AST, Total. Bili, TP)   Result Value Ref Range    Sodium 142 136 - 145 mmol/L    Potassium 4.0 3.4 - 5.3 mmol/L    Chloride 105 98 - 107 mmol/L    Carbon Dioxide (CO2) 29 22 - 29 mmol/L    Anion Gap 8 7 - 15 mmol/L    Urea Nitrogen 12.8 6.0 - 20.0 mg/dL    Creatinine 0.94 0.67 - 1.17 mg/dL    Calcium 9.2 8.6 - 10.0 mg/dL    Glucose 100 (H) 70 - 99 mg/dL    Alkaline Phosphatase 76 40 - 129 U/L    AST 36 0 - 45 U/L    ALT 92 (H) 0 - 70 U/L    Protein Total 7.1 6.4 - 8.3 g/dL    Albumin 4.6 3.5 - 5.2 g/dL    Bilirubin Total 0.6 <=1.2 mg/dL    GFR Estimate >90 >60 mL/min/1.73m2       ASSESSMENT/PLAN:   (Z00.00) Annual physical exam  (primary encounter diagnosis)  Comment:   Plan: Hemoglobin A1c, Lipid panel reflex to direct         LDL Non-fasting, Comprehensive metabolic panel         (BMP + Alb, Alk Phos, ALT, AST, Total. Bili,         TP)            (G47.00) Insomnia, unspecified type  Comment: patient would like to continue Zyprexa, since its working well per patient report. Discussed possible side effects including dyslipidemia, hyperglycemia. His LDL is borderline high today, lab test was done non-fasting, recommended to recheck lipid panel in 3 months, if LDL still elevated will consider alternative treatment plan for insomnia, possible Trazodone   Plan: OLANZapine (ZYPREXA) 10 MG tablet,         Comprehensive metabolic panel (BMP + Alb, Alk         Phos, ALT, AST, Total. Bili, TP)            (R09.81) Congestion of paranasal sinus  Plan: fluticasone (FLONASE) 50 MCG/ACT nasal spray            (E78.5) Hyperlipidemia LDL goal <100  Comment:   Plan: follow healthy diet  reviewed with patient: Eliminate harsh soaps, i.e. Dial, zest, irsih spring; Mild soaps such as Cetaphil or Dove sensitive skin, avoid hot or cold showers, aggressive use of emollients including vanicream, cetaphil or cerave discussed with patient.     and recheck Lipid panel reflex to direct LDL Fasting in 3 months               COUNSELING:   Reviewed preventive health counseling, as reflected in patient instructions       Regular exercise       Healthy diet/nutrition        He reports that he has never smoked. He has never used smokeless tobacco.        KALINA Perez [facility]

## 2025-03-12 ENCOUNTER — HOSPITAL ENCOUNTER (EMERGENCY)
Facility: CLINIC | Age: 35
Discharge: HOME | End: 2025-03-12
Attending: PHYSICIAN ASSISTANT | Admitting: PHYSICIAN ASSISTANT
Payer: COMMERCIAL

## 2025-03-12 VITALS
SYSTOLIC BLOOD PRESSURE: 147 MMHG | TEMPERATURE: 98 F | OXYGEN SATURATION: 98 % | DIASTOLIC BLOOD PRESSURE: 79 MMHG | RESPIRATION RATE: 20 BRPM | HEART RATE: 95 BPM

## 2025-03-12 DIAGNOSIS — M54.2 NECK PAIN: ICD-10-CM

## 2025-03-12 DIAGNOSIS — R68.84 JAW PAIN: ICD-10-CM

## 2025-03-12 DIAGNOSIS — M26.609 TMJ (TEMPOROMANDIBULAR JOINT DISORDER): ICD-10-CM

## 2025-03-12 PROCEDURE — G0463 HOSPITAL OUTPT CLINIC VISIT: HCPCS | Performed by: PHYSICIAN ASSISTANT

## 2025-03-12 PROCEDURE — 99213 OFFICE O/P EST LOW 20 MIN: CPT | Performed by: PHYSICIAN ASSISTANT

## 2025-03-12 RX ORDER — CYCLOBENZAPRINE HCL 10 MG
10 TABLET ORAL
Qty: 10 TABLET | Refills: 0 | Status: SHIPPED | OUTPATIENT
Start: 2025-03-12

## 2025-03-12 RX ORDER — NAPROXEN 500 MG/1
500 TABLET ORAL 2 TIMES DAILY WITH MEALS
Qty: 20 TABLET | Refills: 0 | Status: SHIPPED | OUTPATIENT
Start: 2025-03-12 | End: 2025-03-22

## 2025-03-12 ASSESSMENT — ACTIVITIES OF DAILY LIVING (ADL): ADLS_ACUITY_SCORE: 41

## 2025-03-12 ASSESSMENT — ENCOUNTER SYMPTOMS
NECK PAIN: 1
NECK STIFFNESS: 1
CONSTITUTIONAL NEGATIVE: 1
FEVER: 0

## 2025-03-12 ASSESSMENT — COLUMBIA-SUICIDE SEVERITY RATING SCALE - C-SSRS
2. HAVE YOU ACTUALLY HAD ANY THOUGHTS OF KILLING YOURSELF IN THE PAST MONTH?: NO
6. HAVE YOU EVER DONE ANYTHING, STARTED TO DO ANYTHING, OR PREPARED TO DO ANYTHING TO END YOUR LIFE?: NO
1. IN THE PAST MONTH, HAVE YOU WISHED YOU WERE DEAD OR WISHED YOU COULD GO TO SLEEP AND NOT WAKE UP?: NO

## 2025-03-12 NOTE — ED PROVIDER NOTES
History     Chief Complaint   Patient presents with    Neck Problem     HPI  Berny Fowler is a 34 year old male who presents to Urgent Care with complaints of jaw pain and limited range of motion as well as diffuse neck pain and stiffness since awaking this morning and sleeping wrong.  Denies any trauma.  States ever since an injury of his jaw in 2023, he has dealt with feelings of jaw malalignment and increased pain of his jaw.  He states he clenches his jaw when he sleeps and also puts pressure on the right side of his face and jaw when he sleeps on his stomach nightly.  Patient complains of difficulties opening his mouth fully since this morning as well as feeling like his teeth do not line up normally when he clenches.  Patient also complains of diffuse neck pain and stiffness since awaking this morning.  Denies associated infectious symptoms.  Denies fevers, chills, headache, extremity numbness/tingling/weakness, rash, cough, sore throat, sinus pressure, nasal congestion, nausea, or vomiting.  Tried taking ibuprofen before coming in.  According to chart review, patient was seen for jaw pain in 2019 and was prescribed Flexeril.  History of grinding teeth per that note.  Patient was then seen in 2023 for jaw pain and concerns that his jaw might be dislocated.  X-rays of his mandible were negative for fracture at that time.  Treated with Zyprexa for insomnia.      Allergies:  No Known Allergies    Problem List:    Patient Active Problem List    Diagnosis Date Noted    Impacted cerumen 09/26/2012     Priority: Medium    Scabies 09/26/2012     Priority: Medium    Acne 08/04/2011     Priority: Medium    Flat feet 08/04/2011     Priority: Medium    CARDIOVASCULAR SCREENING; LDL GOAL LESS THAN 160 09/26/2012     Priority: Low        Past Medical History:    Past Medical History:   Diagnosis Date    Undescended testis        Past Surgical History:    Past Surgical History:   Procedure Laterality Date    NO  HISTORY OF SURGERY         Family History:    Family History   Problem Relation Age of Onset    IDALIA. Father         MI at age 42 with stent placement    Hypertension Father     Lipids Father     [email or web address].SHONA. Maternal Grandfather     CHRIS.JAISON. Paternal Grandfather     Allergies Sister     Cancer Paternal Grandmother         bone     Melanoma No family hx of        Social History:  Marital Status:  Single [1]  Social History     Tobacco Use    Smoking status: Never    Smokeless tobacco: Never   Vaping Use    Vaping status: Never Used   Substance Use Topics    Alcohol use: Yes     Comment: social     Drug use: No        Medications:    cyclobenzaprine (FLEXERIL) 10 MG tablet  naproxen (NAPROSYN) 500 MG tablet  fluticasone (FLONASE) 50 MCG/ACT nasal spray  OLANZapine (ZYPREXA) 10 MG tablet          Review of Systems   Constitutional: Negative.  Negative for fever.   HENT:          Jaw pain   Musculoskeletal:  Positive for neck pain and neck stiffness.   All other systems reviewed and are negative.      Physical Exam   BP: (!) 147/79  Pulse: 95  Temp: 98  F (36.7  C)  Resp: 20  SpO2: 98 %      Physical Exam  Constitutional:       General: He is not in acute distress.     Appearance: Normal appearance. He is well-developed. He is not ill-appearing, toxic-appearing or diaphoretic.   HENT:      Head: Normocephalic and atraumatic.      Jaw: Tenderness, swelling and pain on movement present. No malocclusion.      Comments: Pain with opening mouth.  Able to open mouth long-term.  Able to bite down and break a tongue depressor on both sides.  Tenderness along the muscles of mastication.  No palpable clicking.  No overlying skin changes.     Right Ear: Tympanic membrane, ear canal and external ear normal.      Left Ear: Tympanic membrane, ear canal and external ear normal.      Nose: Nose normal. No rhinorrhea.      Mouth/Throat:      Pharynx: No oropharyngeal exudate, posterior oropharyngeal erythema or uvula swelling.      Tonsils: No  tonsillar abscesses.   Eyes:      Conjunctiva/sclera: Conjunctivae normal.      Pupils: Pupils are equal, round, and reactive to light.   Neck:      Comments: Palpable muscle spasms bilaterally in the neck and trapezius   Cardiovascular:      Rate and Rhythm: Normal rate and regular rhythm.      Heart sounds: Normal heart sounds.   Pulmonary:      Effort: Pulmonary effort is normal. No respiratory distress.      Breath sounds: Normal breath sounds. No stridor. No wheezing or rales.   Musculoskeletal:      Cervical back: Neck supple. Torticollis present. No edema, erythema, rigidity or crepitus. Pain with movement and muscular tenderness present. No spinous process tenderness. Decreased range of motion.   Lymphadenopathy:      Cervical: No cervical adenopathy.   Skin:     General: Skin is warm and dry.   Neurological:      General: No focal deficit present.      Mental Status: He is alert and oriented to person, place, and time.      Sensory: Sensation is intact.      Motor: Motor function is intact.      Coordination: Coordination is intact.   Psychiatric:         Behavior: Behavior is cooperative.         ED Course        Procedures      No results found for this or any previous visit (from the past 24 hours).    Medications - No data to display    Assessments & Plan (with Medical Decision Making)     Pt is a 34 year old male who presents to Urgent Care with complaints of jaw pain and limited range of motion as well as diffuse neck pain and stiffness since awaking this morning and sleeping wrong.  Denies any trauma.  States ever since an injury of his jaw in 2023, he has dealt with feelings of jaw malalignment and increased pain of his jaw.  He states he clenches his jaw when he sleeps and also puts pressure on the right side of his face and jaw when he sleeps on his stomach nightly.  Patient complains of difficulties opening his mouth fully since this morning as well as feeling like his teeth do not line up  normally when he clenches.  Patient also complains of diffuse neck pain and stiffness since awaking this morning.  Denies associated infectious symptoms.      Pt is afebrile on arrival.  Exam as above.  On exam, pt has pain with opening mouth.  Able to open mouth FDC.  Able to bite down and break a tongue depressor on both sides.  Tenderness along the muscles of mastication.  Also with diffuse neck tenderness/muscle spasms.  History and exam consistent with TMJ syndrome.  Encouraged symptomatic treatments at home.  Return precautions were reviewed.  Hand-outs were provided.    Patient was sent with Flexeril and Naproxen (taken with food) and was instructed to follow-up with a dentist for continued care and management.  He is to return to the ED for persistent and/or worsening symptoms.  Patient expressed understanding of the diagnosis and plan and was discharged home in good condition.    I have reviewed the nursing notes.    I have reviewed the findings, diagnosis, plan and need for follow up with the patient.    Discharge Medication List as of 3/12/2025  6:18 PM        START taking these medications    Details   cyclobenzaprine (FLEXERIL) 10 MG tablet Take 1 tablet (10 mg) by mouth nightly as needed for other (jaw pain, muscle spasms)., Disp-10 tablet, R-0, E-Prescribe      naproxen (NAPROSYN) 500 MG tablet Take 1 tablet (500 mg) by mouth 2 times daily (with meals) for 10 days., Disp-20 tablet, R-0, E-Prescribe             Final diagnoses:   Jaw pain   TMJ (temporomandibular joint disorder)   Neck pain       3/12/2025   [facility] EMERGENCY DEPT      Disclaimer:  This note consists of symbols derived from keyboarding, dictation and/or voice recognition software.  As a result, there may be errors in the script that have gone undetected.  Please consider this when interpreting information found in this chart.     Halle Mcghee PA-C  03/12/25 2029       Halle Mcghee PA-C  03/12/25 2030

## 2025-03-13 ENCOUNTER — TELEPHONE (OUTPATIENT)
Dept: FAMILY MEDICINE | Facility: CLINIC | Age: 35
End: 2025-03-13
Payer: COMMERCIAL

## 2025-03-13 NOTE — TELEPHONE ENCOUNTER
Writer followed up with clinic management/Junaid regarding this request, to determine if able to share this information or if SAL would need to be completed by pt first. Junaid advised that since pt has signed a general consent for services in the last 5 years, that this information can be shared for these emergency services. Writer attempted to contact their clinic, office is currently closed. Will need to recall tomorrow.    Delmi Avina RN  [facility]

## 2025-03-13 NOTE — TELEPHONE ENCOUNTER
He does not have cirrhosis, he has history of fatty liver disease, unless he was seen somewhere else recently and was diagnosed with cirrhosis, but his last liver US in 2021 did not show cirrhosis and his liver enzymes were normal.   He does not have any history of heart issues that would require prophylactic antibiotics before dental procedures.   His last CBC in 2023 was normal, no history of anemia and according to chart he does not have any history of GI bleed, or other bleeding.  KALINA Perez CNP

## 2025-03-13 NOTE — TELEPHONE ENCOUNTER
Alexandra from [facility] [employer]   [facility] called regarding patient.     Patient is in need of a tooth extraction ASAP. He mentioned to them that he has liver cirrhosis and has not been seen in a while.   They are wanting to know if the patient will need any antibiotic prophylaxis, if there are concerns for bleeding, or concerns for other dental issues.    Rn did explain that without having seen him recently that a provider may not be able to answer these questions. She is aware and will await response from Fauzia Rodriguez     Call back number is [phone]. Use this number first. She also gave a number [phone]. Let them know he is a patient of the [facility].     Answer is needed ASAP.  Anne Marie Kerr RN on 3/13/2025 at 2:38 PM

## 2025-03-14 NOTE — TELEPHONE ENCOUNTER
Writer attempted to contact this clinic, they're currently closed. Will need to recall during business hours.    Delmi Avina RN  [facility]

## 2025-03-15 ENCOUNTER — HOSPITAL ENCOUNTER (EMERGENCY)
Facility: CLINIC | Age: 35
Discharge: HOME | End: 2025-03-15
Attending: EMERGENCY MEDICINE | Admitting: EMERGENCY MEDICINE
Payer: COMMERCIAL

## 2025-03-15 ENCOUNTER — HOSPITAL ENCOUNTER (EMERGENCY)
Facility: CLINIC | Age: 35
Discharge: HOME | End: 2025-03-16
Attending: EMERGENCY MEDICINE | Admitting: EMERGENCY MEDICINE
Payer: COMMERCIAL

## 2025-03-15 VITALS
OXYGEN SATURATION: 96 % | BODY MASS INDEX: 22.22 KG/M2 | WEIGHT: 150 LBS | TEMPERATURE: 98.4 F | HEIGHT: 69 IN | HEART RATE: 115 BPM | DIASTOLIC BLOOD PRESSURE: 116 MMHG | RESPIRATION RATE: 22 BRPM | SYSTOLIC BLOOD PRESSURE: 164 MMHG

## 2025-03-15 VITALS
SYSTOLIC BLOOD PRESSURE: 151 MMHG | DIASTOLIC BLOOD PRESSURE: 100 MMHG | OXYGEN SATURATION: 98 % | RESPIRATION RATE: 18 BRPM | HEART RATE: 85 BPM | TEMPERATURE: 98.9 F

## 2025-03-15 DIAGNOSIS — G47.09 OTHER INSOMNIA: ICD-10-CM

## 2025-03-15 DIAGNOSIS — F32.A DEPRESSION, UNSPECIFIED DEPRESSION TYPE: ICD-10-CM

## 2025-03-15 PROBLEM — F43.22 ADJUSTMENT DISORDER WITH ANXIOUS MOOD: Status: ACTIVE | Noted: 2025-03-15

## 2025-03-15 PROCEDURE — 99284 EMERGENCY DEPT VISIT MOD MDM: CPT | Performed by: EMERGENCY MEDICINE

## 2025-03-15 PROCEDURE — 250N000011 HC RX IP 250 OP 636: Performed by: EMERGENCY MEDICINE

## 2025-03-15 PROCEDURE — 96372 THER/PROPH/DIAG INJ SC/IM: CPT | Performed by: EMERGENCY MEDICINE

## 2025-03-15 PROCEDURE — 250N000013 HC RX MED GY IP 250 OP 250 PS 637: Performed by: EMERGENCY MEDICINE

## 2025-03-15 RX ORDER — HYDROXYZINE HYDROCHLORIDE 25 MG/1
25 TABLET, FILM COATED ORAL 3 TIMES DAILY PRN
Qty: 15 TABLET | Refills: 0 | Status: SHIPPED | OUTPATIENT
Start: 2025-03-15

## 2025-03-15 RX ORDER — OLANZAPINE 5 MG/1
5 TABLET, ORALLY DISINTEGRATING ORAL AT BEDTIME
Status: DISCONTINUED | OUTPATIENT
Start: 2025-03-15 | End: 2025-03-15 | Stop reason: HOSPADM

## 2025-03-15 RX ORDER — OLANZAPINE 5 MG/1
10 TABLET ORAL AT BEDTIME
Qty: 20 TABLET | Refills: 0 | Status: SHIPPED | OUTPATIENT
Start: 2025-03-15 | End: 2025-03-25

## 2025-03-15 RX ORDER — ACETAMINOPHEN 500 MG
1000 TABLET ORAL ONCE
Status: COMPLETED | OUTPATIENT
Start: 2025-03-15 | End: 2025-03-15

## 2025-03-15 RX ORDER — HYDROXYZINE HYDROCHLORIDE 25 MG/1
25 TABLET, FILM COATED ORAL ONCE
Status: COMPLETED | OUTPATIENT
Start: 2025-03-15 | End: 2025-03-15

## 2025-03-15 RX ORDER — KETOROLAC TROMETHAMINE 30 MG/ML
30 INJECTION, SOLUTION INTRAMUSCULAR; INTRAVENOUS ONCE
Status: COMPLETED | OUTPATIENT
Start: 2025-03-15 | End: 2025-03-15

## 2025-03-15 RX ADMIN — ACETAMINOPHEN 1000 MG: 500 TABLET, FILM COATED ORAL at 23:13

## 2025-03-15 RX ADMIN — HYDROXYZINE HYDROCHLORIDE 25 MG: 25 TABLET ORAL at 18:23

## 2025-03-15 RX ADMIN — KETOROLAC TROMETHAMINE 30 MG: 30 INJECTION, SOLUTION INTRAMUSCULAR; INTRAVENOUS at 23:13

## 2025-03-15 ASSESSMENT — COLUMBIA-SUICIDE SEVERITY RATING SCALE - C-SSRS
2. HAVE YOU ACTUALLY HAD ANY THOUGHTS OF KILLING YOURSELF IN THE PAST MONTH?: NO
1. IN THE PAST MONTH, HAVE YOU WISHED YOU WERE DEAD OR WISHED YOU COULD GO TO SLEEP AND NOT WAKE UP?: YES
5. HAVE YOU STARTED TO WORK OUT OR WORKED OUT THE DETAILS OF HOW TO KILL YOURSELF? DO YOU INTEND TO CARRY OUT THIS PLAN?: NO
3. HAVE YOU BEEN THINKING ABOUT HOW YOU MIGHT KILL YOURSELF?: YES
6. HAVE YOU EVER DONE ANYTHING, STARTED TO DO ANYTHING, OR PREPARED TO DO ANYTHING TO END YOUR LIFE?: YES
4. HAVE YOU HAD THESE THOUGHTS AND HAD SOME INTENTION OF ACTING ON THEM?: NO
6. HAVE YOU EVER DONE ANYTHING, STARTED TO DO ANYTHING, OR PREPARED TO DO ANYTHING TO END YOUR LIFE?: NO
2. HAVE YOU ACTUALLY HAD ANY THOUGHTS OF KILLING YOURSELF IN THE PAST MONTH?: YES
1. IN THE PAST MONTH, HAVE YOU WISHED YOU WERE DEAD OR WISHED YOU COULD GO TO SLEEP AND NOT WAKE UP?: NO

## 2025-03-15 ASSESSMENT — ACTIVITIES OF DAILY LIVING (ADL)
ADLS_ACUITY_SCORE: 41

## 2025-03-15 NOTE — CONSULTS
Diagnostic Evaluation Consultation  Crisis Assessment    Patient Name: Berny Fowler  Age:  34 year old  Legal Sex: male  Gender Identity: male  Pronouns:   Race: White  Ethnicity: Not  or   Language: English      Patient was assessed: Virtual: [employer]   Crisis Assessment Start Date: 03/15/25  Crisis Assessment Start Time: 1716  Crisis Assessment Stop Time: 1737  Patient location: [facility] Emergency Dept                             ED14    Referral Data and Chief Complaint  Berny Fowler presents to the ED with family/friends. Patient is presenting to the ED for the following concerns: Depression, Paranoia, Worsening psychosocial stress, Significant behavioral change, Health stressors. Factors that make the mental health crisis life threatening or complex are: Patient is a 34 year old male with a medical history significant for insomnia, history of episodes with worsening insomnia, history of inactivity presenting for evaluation of difficulty sleeping secondary to neck pain and left knee pain.  He reports that he woke up 1 morning and he had some stiffness in his neck.  This was after he slept on his face.  He also notes that he sits with his legs crossed piquantly and this causes knee pain for him.  He plays video games until 3 in the morning frequently.  He notes that he came to the emergency department and was given some muscle relaxers for his knee pain and his neck pain however these have not helped.  He notes that he is now no longer able to sleep at night at all because of his pain.  Has not done physical therapy.  He has taken ibuprofen 600 mg which does not help.  He reports that in the past when he is struggled with insomnia he came to the emergency department and he was given IV Zyprexa which helped him rest.  He was also discharged with hydroxyzine.  He notes that this point his insomnia is so severe that is causing him to feel suicidal.  He has not attempted suicide  "nor has he done any self-harm.  He does not have a plan but he states that he does feel suicidal because the insomnia is so severe.  No recent car accidents or other traumatic incidences.      Informed Consent and Assessment Methods  Explained the crisis assessment process, including applicable information disclosures and limits to confidentiality, assessed understanding of the process, and obtained consent to proceed with the assessment.  Assessment methods included conducting a formal interview with patient, review of medical records, collaboration with medical staff, and obtaining relevant collateral information from family and community providers when available.  : done     History of the Crisis   Patient denies any history of previous mental health concerns or mental health therapies.  No history of suicidal ideation, suicide attempts, SIB, HI, substance abuse.  Patient feels that his thoughts of suicide are not \"I want to die\" more of \"I cannot live like this much longer\".  He does express hope for the future and feeling as though his mental health can improve with sleep and sleeping medication.    Brief Psychosocial History  Family:  Single, Children no  Support System:  Parent(s), Sibling(s)  Employment Status:  unemployed  Source of Income:  none  Financial Environmental Concerns:  none  Current Hobbies:  television/movies/videos, social media/computer activities  Barriers in Personal Life:   (Health stressors)    Significant Clinical History  Current Anxiety Symptoms:  anxious  Current Depression/Trauma:  helplessness, hopelessness, sadness  Current Somatic Symptoms:  anxious, excessive worry, racing thoughts  Current Psychosis/Thought Disturbance:   (Reports he has some auditory and visual hallucinations after 3 days of no sleep but nothing normally and nothing current)  Current Eating Symptoms:   (Denies)  Chemical Use History:  Alcohol: None  Benzodiazepines: None  Opiates: None  Cocaine: " None  Marijuana: None  Other Use: None   Past diagnosis:  No known past diagnosis  Family history:  Anxiety Disorder, Depression  Past treatment:  No known formal treatment attempts  Details of most recent treatment:     Other relevant history:       Have there been any medication changes in the past two weeks:  no       Is the patient compliant with medications:  no        Collateral Information  Is there collateral information: Yes     Collateral information name, relationship, phone number:  JULIÁN EASTON (Mother)  [phone]    What happened today: Patient's mother was present throughout the assessment.  Reports that patient is normally very healthy and has good mental health but has been struggling due to some physical ailments which are causing him to be unable to sleep.     What is different about patient's functioning: She feels that patient can successfully get back to baseline if he is able to sleep     What do you think the patient needs:      Has patient made comments about wanting to kill themselves/others: no    If d/c is recommended, can they take part in safety/aftercare planning:  yes    Additional collateral information:        Risk Assessment  [place] Suicide Severity Rating Scale Full Clinical Version:  Suicidal Ideation  Q1 Wish to be Dead (Lifetime): No  Q2 Non-Specific Active Suicidal Thoughts (Lifetime): No  Q6 Suicide Behavior (Lifetime): no  Intensity of Ideation (Lifetime)  Most Severe Ideation Rating (Lifetime):  (Denies SI)  Suicidal Behavior (Lifetime)  Actual Attempt (Lifetime): No  Has subject engaged in non-suicidal self-injurious behavior? (Lifetime): No  Interrupted Attempts (Lifetime): No  Aborted or Self-Interrupted Attempt (Lifetime): No  Preparatory Acts or Behavior (Lifetime): No    [place] Suicide Severity Rating Scale Recent:   Suicidal Ideation (Recent)  Q1 Wished to be Dead (Past Month): no  Q2 Suicidal Thoughts (Past Month): no  If yes to Q6, within past 3 months?:  no  Level of Risk per Screen: moderate risk  Intensity of Ideation (Recent)  Most Severe Ideation Rating (Past 1 Month):  (Denies current SI)  Reasons for Ideation (Past 1 Month): Does not apply  Suicidal Behavior (Recent)  Actual Attempt (Past 3 Months): No  Has subject engaged in non-suicidal self-injurious behavior? (Past 3 Months): No  Interrupted Attempts (Past 3 Months): No  Aborted or Self-Interrupted Attempt (Past 3 Months): No  Preparatory Acts or Behavior (Past 3 Months): No    Environmental or Psychosocial Events: other (see comment), neither working nor attending school, work or task failure (Health stressors)  Protective Factors: Protective Factors: strong bond to family unit, community support, or employment, help seeking, constructive use of leisure time, enjoyable activities, resilience    Does the patient have thoughts of harming others? Feels Like Hurting Others: no  Previous Attempt to Hurt Others: no  Is the patient engaging in sexually inappropriate behavior?: no    Is the patient engaging in sexually inappropriate behavior?  no        Mental Status Exam   Affect: Appropriate  Appearance: Appropriate  Attention Span/Concentration: Attentive  Eye Contact: Engaged    Fund of Knowledge: Appropriate   Language /Speech Content: Fluent  Language /Speech Volume: Normal  Language /Speech Rate/Productions: Normal  Recent Memory: Intact  Remote Memory: Intact  Mood: Normal  Orientation to Person: Yes   Orientation to Place: Yes  Orientation to Time of Day: Yes  Orientation to Date: Yes     Situation (Do they understand why they are here?): Yes  Psychomotor Behavior: Normal  Thought Content: Clear  Thought Form: Intact     Mini-Cog Assessment  Number of Words Recalled:    Clock-Drawing Test:     Three Item Recall:    Mini-Cog Total Score:       Medication  Psychotropic medications:   Medication Orders - Psychiatric (From admission, onward)      Start     Dose/Rate Route Frequency Ordered Stop    03/15/25  2200  OLANZapine zydis (zyPREXA) ODT tab 5 mg         5 mg Oral AT BEDTIME 03/15/25 1545      03/15/25 1550  hydrOXYzine HCl (ATARAX) tablet 25 mg         25 mg Oral ONCE 03/15/25 1545      03/15/25 0000  hydrOXYzine HCl (ATARAX) 25 MG tablet         25 mg Oral 3 TIMES DAILY PRN 03/15/25 1546      03/15/25 0000  OLANZapine (ZYPREXA) 5 MG tablet         10 mg Oral AT BEDTIME 03/15/25 1546 03/25/25 4807             Current Care Team  Patient Care Team:  [facility] - Fauzia Tinajero APRN CNP as Assigned PCP    Diagnosis  Patient Active Problem List   Diagnosis Code    Acne L70.9    Flat feet M21.41, M21.42    CARDIOVASCULAR SCREENING; LDL GOAL LESS THAN 160 Z13.6    Impacted cerumen H61.20    Scabies B86    Adjustment disorder with anxious mood F43.22       Primary Problem This Admission  Active Hospital Problems    *Adjustment disorder with anxious mood        Clinical Summary and Substantiation of Recommendations   Clinical Substantiation:  After therapeutic assessment, intervention and aftercare planning by ED care team and LMHP and in consultation with attending provider, the patient's circumstances and mental state were appropriate for outpatient management. It is the recommendation of this clinician that pt discharge with OP MH support.  offered to set patient up with a medication provider, patient would like to reach out to a provider next to his house called [facility].  encouraged him to call the triage and transition team if appointments for [facility] are too far out.  Patient mother was at bedside she confirmed she will assist patient in getting access to medication management. The ED MD is willing to prescribe patient some hydroxyzine to go home with which has been known to be helpful in the past when patient is unable to sleep. At this time the pt is not presenting as an acute risk to self or others due to the following factors: Pt denies SI/SIB/SA/HI and denies  plans, means, or intent to harm herself or others. Patient actively participated in safety planning,    Goals for crisis stabilization:  Discharge home and follow-up with medication provider    Next steps for Care Team:  Discharge    Treatment Objectives Addressed:  rapport building, orienting the patient to therapy, identifying and practicing coping strategies, safety planning, identifying treatment goals, building self-esteem, identifying an appropriate aftercare plan, assessing safety, Lethal means counseling    Therapeutic Interventions:  Engaged in safety planning, Taught the link between thoughts, feelings, and behaviors.    Has a specific means been identified for suicidal/homicide actions: No        Patient coping skills attempted to reduce the crisis:  Arrived in the emergency department    Disposition  Recommended referrals: Medication Management        Reviewed case and recommendations with attending provider. Attending Name: Sam Aviles MD       Attending concurs with disposition: yes       Patient and/or validated legal guardian concurs with disposition:   yes       Final disposition:  discharge    Legal status: Voluntary/Patient has signed consent for treatment                                                                                                                                 Reviewed court records: yes       Assessment Details   Total duration spent with the patient: 23 min     CPT code(s) utilized: 95927 - Psychotherapy (with patient) - 30 (16-37*) min    VONDA Figueroa, Psychotherapist  [facility] - Triage & Transition Services  Callback: [phone]

## 2025-03-15 NOTE — DISCHARGE INSTRUCTIONS
You were seen in the emergency department today for chief complaint of neck pain and knee pain that are contributing to some insomnia for you.  I recommend that you start taking the Zyprexa as needed for your symptoms as this has helped you in the past.  I also recommend that you start going to physical therapy for your neck and knee pain.  In addition I recommend that you take hydroxyzine as needed for anxiety and for difficulty sleeping.    Take this medication as prescribed and follow-up with a primary care provider as needed.  I did provide you with a referral for a primary care provider I recommend that you discuss your symptoms with them.    Aftercare Plan  If I am feeling unsafe or I am in a crisis, I will:   Contact my established care providers   Call the National Suicide Prevention Lifeline: 452.996.1353   Go to the nearest emergency room   Call 911     Warning signs that I or other people might notice when a crisis is developing for me:     I am having increasing suicidal thoughts that turn to plans with intent or means  I am having additional urges to self-harm    My emotions are of hopelessness; feeling like there's no way out.  Rage or anger.  Engaging in risky activities without thinking  Withdrawing from family/friends  Dramatic mood swings  Drastic personality changes   Use of alcohol or drugs  Postings on social media  Neglect of personal hygiene or cares      Things I am able to do on my own to cope or help me feel better:    Other things to Try:  Spending quality time with loved ones  Staying hydrated  Eating balanced meals  Going for a walk every day  Take care of daily responsibilities/needs  Focus on positive self-talk vs negative self-talk     Things that I am able to do with others to cope or help me better:   Other things to Try:  Exercise  Music  Deep breathing  Meditations  Journal  Self-regulate  Self check-in  Ask for help     Things I can use or do for distraction:   Other things to  Try:  Reach out to/spend time with family, friends  Shower  Exercise  Chores or do a project  Listen to music  Watch movie/TV  Listening to music  Journaling  Reading a book  Meditating  Call a friend     Changes I can make to support my mental health and wellness:    -I will abstain from all mood altering chemicals not currently prescribed to me   -I will attend scheduled mental health therapy and psychiatric appointments and follow all   recommendations  -I will commit to 30 minutes of self care daily - this can be as simple as taking a shower, going for a   walk, cooking a meal, read, writing, etc  -I will practice square breathing when I begin to feel anxious - in breath through the nose for the count   of 4 and the first line on the square. Out breath through the mouth for the count of 4 for the second line   of the square. Repeat to complete the square. Repeat the square as many times as needed.  - I will use distraction skills of: going for walks, watching TV, spending time outside, calling a friend or   family member  -Use community resources, including hotline numbers, Community Health crisis and support meetings  -Maintain a daily schedule/routine  -Practice deep breathing skills  -Download a meditation darek and spend 15-20 minutes per day mediating/relaxing. Some apps to   download include: Calm, Headspace and Insight Timer. All 3 of these apps have free version     People in my life that I can ask for help:   Family  Friends      Your Community Health has a mental health crisis team you can call 24/7:  [facility]  Mobile Crisis response crisis line at 1-594.868.5074          Crisis Lines  Crisis Text Line  Text 375877  You will be connected with a trained live crisis counselor to provide support.    Por gretelanol, texto  ABHINAV a 761037 o texto a 442-AYUDAME en WhatsApp    [employer] (LGBTQ Youth Crisis Line)  4.763.242.7989  text START to 583-180      Community Resources  Fast Tracker  Linking people to mental  "health and substance use disorder resources  Streamline.PrecisionDemand     [place] Mental Health Warm Line  Peer to peer support  Monday thru Saturday, 12 pm to 10 pm  096.343.7142 or 3.091.749.5151  Text \"Support\" to 99173    National [employer] on Mental Illness (JOSEFA)  478.013.6771 or 1.888.JOSEFA.HELPS      Mental Health Apps  My3  https://SLID.PrecisionDemand/    GeoGraffitieBox  https://Visualaseorg/apps/virtual-hope-box/      Crisis Lines  Crisis Text Line  Text 866479  You will be connected with a trained live crisis counselor to provide support.    Por espanol, texto  ABHINAV a 833525 o texto a 442-AYUDAME en WhatsApp    National Hope Line  1.800.SUICIDE [3929977]      Community Resources  Fast Tracker  Linking people to mental health and substance use disorder resources  Streamline.PrecisionDemand     [place] Mental Health Warm Line  Peer to peer support  Monday thru Saturday, 12 pm to 10 pm  754.675.2653 or 0.253.515.6068  Text \"Support\" to 79431    National [employer] on Mental Illness (JOSEFA)  089.554.3548 or 1.888.JOSEFA.HELPS      Mental Health Apps  My3  https://Message Systems/    TurboTranslations  https://Smart Devices.org/apps/virtual-hope-box/      Additional Information  Today you were seen by a licensed mental health professional through Triage and Transition services, Behavioral Healthcare Providers ([facility])  for a crisis assessment in the Emergency Department at [facility].  It is recommended that you follow up with your established providers (psychiatrist, mental health therapist, and/or primary care doctor - as relevant) as soon as possible. Coordinators from [facility] will be calling you in the next 24-48 hours to ensure that you have the resources you need.  You can also contact [facility] coordinators directly at [phone]. You may have been scheduled for or offered an appointment with a mental health provider. [facility] maintains an extensive network of licensed behavioral health providers to connect patients with the " services they need.  We do not charge providers a fee to participate in our referral network.  We match patients with providers based on a patient's specific needs, insurance coverage, and location.  Our first effort will be to refer you to a provider within your care system, and will utilize providers outside your care system as needed.

## 2025-03-15 NOTE — PLAN OF CARE
Berny Fowler  March 15, 2025  Plan of Care Hand-off Note     Patient Recommended Care Path: discharge    Clinical Substantiation:  After therapeutic assessment, intervention and aftercare planning by ED care team and LM and in consultation with attending provider, the patient's circumstances and mental state were appropriate for outpatient management. It is the recommendation of this clinician that pt discharge with OP MH support.  offered to set patient up with a medication provider, patient would like to reach out to a provider next to his house called [facility].  encouraged him to call the triage and transition team if appointments for [facility] are too far out.  Patient mother was at bedside she confirmed she will assist patient in getting access to medication management. The ED MD is willing to prescribe patient some hydroxyzine to go home with which has been known to be helpful in the past when patient is unable to sleep. At this time the pt is not presenting as an acute risk to self or others due to the following factors: Pt denies SI/SIB/SA/HI and denies plans, means, or intent to harm herself or others. Patient actively participated in safety planning,    Goals for crisis stabilization:  Discharge home and follow-up with medication provider    Next steps for Care Team:  Discharge    Treatment Objectives Addressed:  rapport building, orienting the patient to therapy, identifying and practicing coping strategies, safety planning, identifying treatment goals, building self-esteem, identifying an appropriate aftercare plan, assessing safety, Lethal means counseling    Therapeutic Interventions:  Engaged in safety planning, Taught the link between thoughts, feelings, and behaviors.    Has a specific means been identified for suicidal.homicide actions: No      Patient coping skills attempted to reduce the crisis:  Arrived in the emergency department                          Collateral contact  information:  JULIÁN EASTON (Mother)  [phone]    Legal Status: Voluntary/Patient has signed consent for treatment                                                                                                                                 Reviewed court records: yes     Psychiatry Consult:     STEPHANI FigueroaSW

## 2025-03-15 NOTE — ED PROVIDER NOTES
"[facility]  Emergency Department Visit Note    PATIENT:  Berny Fowler     34 year old     male      [id number]    Chief complaint:  Chief Complaint   Patient presents with    Neck Pain    Back Pain        History of present illness:  Patient is a 34 year old male with a medical history significant for insomnia, history of episodes with worsening insomnia, history of inactivity presenting for evaluation of difficulty sleeping secondary to neck pain and left knee pain.  He reports that he woke up 1 morning and he had some stiffness in his neck.  This was after he slept on his face.  He also notes that he sits with his legs crossed piquantly and this causes knee pain for him.  He plays video games until 3 in the morning frequently.  He notes that he came to the emergency department and was given some muscle relaxers for his knee pain and his neck pain however these have not helped.  He notes that he is now no longer able to sleep at night at all because of his pain.  Has not done physical therapy.  He has taken ibuprofen 600 mg which does not help.  He reports that in the past when he is struggled with insomnia he came to the emergency department and he was given IV Zyprexa which helped him rest.  He was also discharged with hydroxyzine.  He notes that this point his insomnia is so severe that is causing him to feel suicidal.  He has not attempted suicide nor has he done any self-harm.  He does not have a plan but he states that he does feel suicidal because the insomnia is so severe.  No recent car accidents or other traumatic incidences    Review of Systems:  As in HPI above    BP (!) 164/116   Pulse 115   Temp 98.4  F (36.9  C) (Oral)   Resp 22   Ht 1.753 m (5' 9\")   Wt 68 kg (150 lb)   SpO2 96%   BMI 22.15 kg/m      Physical Exam  Constitutional: laying in hospital bed, alert, oriented, in no apparent distress, conversant, and answering questions appropriately  HEENT: normocephalic, " atraumatic, pupils 3mm, equal, round, and reactive to light, sclerae anicteric, extraocular motions intact, moist mucous membranes, and poor dentition  Neck: able to fully range, no midline tenderness, no stridor, and paraspinous tenderness present  Cardiovascular: regular rate and rhythm  Pulmonary: breathing comfortably on room air  Abdominal: not visibly distended  Genitourinary: deferred  Extremities/MSK: No peripheral edema, no deformity, no swelling of the left knee, no pain with full passive and active range of motion of the left knee, strength is normal in bilateral hands and feet  Skin: warm, dry, non-diaphoretic, and diffuse flaking rash  Neurologic: moves all four extremities spontaneously, GCS 15, 5/5  strength bilaterally, 5/5 strength in dorsiflexion and plantarflexion bilaterally, sensation intact to light touch in bilateral upper and lower extremities, and ambulates without assistance  Psychiatric: calm, appropriate      MDM:  Patient is a 34 year old male with above history presenting for evaluation of neck pain and knee pain causing insomnia.    Vitals reassuring and within normal limits. Exam at this point is notable for no neuromuscular deficits, bilateral paraspinal tenderness to palpation along the cervical spine, full range of motion of the neck and the knee, suicidal ideation.    Plan at this time for DEC evaluation, Zyprexa, hydroxyzine and prescription for hydroxyzine as well as naproxen for help sleeping and referral to follow-up with primary care and physical therapy.  Patient is in agreement with this plan.    Seems that patient has had episodes in the past where he has difficulty falling asleep and I am suspicious that he may have some underlying bipolar disorder and these are hypomanic or manic episodes.  He does endorse difficulty sleeping at this time but no other risky behaviors.  This in conjunction with his stress and suicidal ideation him a little more concerned about  psychiatric involvement.  It is also possible that he actually has insomnia because of his pain so plan to treat this as well with antipsychotics.      Encounter Diagnoses:  Final diagnoses:   Other insomnia       Final disposition: pending further workup, patient signed out to oncoming physician, Dr. Traci Rolon,    Physician  [facility]       Jacqueline Rolon,   03/15/25 8432

## 2025-03-15 NOTE — ED PROVIDER NOTES
5:57 PM  The patient was assessed by the DEC.  They do not feel that this patient is at risk for suicide.  They have contracted the patient for safety.  They recommend disposition to home and the consideration of hydroxyzine to help him with sleep and as needed anxiety.     Sam Aviles MD  03/15/25 5857

## 2025-03-16 ASSESSMENT — ACTIVITIES OF DAILY LIVING (ADL)
ADLS_ACUITY_SCORE: 41

## 2025-03-16 NOTE — CONSULTS
"Diagnostic Evaluation Consultation  Crisis Assessment    Patient Name: Berny Fowler  Age:  34 year old  Legal Sex: male  Gender Identity: male  Pronouns: He/him/his  Race: White  Ethnicity: Not  or   Language: English      Patient was assessed: Virtual: [employer]   Crisis Assessment Start Date: 03/16/25  Crisis Assessment Start Time: 0223  Crisis Assessment Stop Time: 0258  Patient location: [facility] Emergency Dept                                 Referral Data and Chief Complaint  Berny Fowler presents to the ED by  self. Patient is presenting to the ED for the following concerns: Anxiety, Suicidal ideation. Factors that make the mental health crisis life threatening or complex are: Patient  presents with anxiety and insomnia. He reports not having slept for four nights. Patient came to the ED yesterday (3/15) for same and was discharged to home with hydroxyzine to aid sleep. During the evening, patient took the hydroxyzine and was still unable to sleep, so he returned to the ED.  Patient initially alluded to suicidal ideation on admission. On interview, patient clarified that he does not have a suicide plan nor intent. He explained that, when still unable to sleep with hydroxyzine, he felt hopeless and frustrated. He briefly thought of \"giving up\". Patient reports neck and back pain. He stated that he has an appointment for physical therapy. Patient was assessed by DEC during his 3/15/2025 ED admission. At that time, he completed a safety plan and was discharged to home with referrals for therapy and psychiatry.      Informed Consent and Assessment Methods  Explained the crisis assessment process, including applicable information disclosures and limits to confidentiality, assessed understanding of the process, and obtained consent to proceed with the assessment.  Assessment methods included conducting a formal interview with patient, review of medical records, collaboration " "with medical staff, and obtaining relevant collateral information from family and community providers when available.  : done     History of the Crisis   Patient denies previous psychiatric diagnosis or treatment. He reports having one alcoholic beverage on  and . He denies use of cannabis or illicit drugs. Family history is significant for depression and anxiety. He reported that a cousin  by suicide two years ago.    Brief Psychosocial History  Family:  Single, Children no  Support System:  Parent(s), Grandparent(s), Sibling(s)  Employment Status:  unemployed  Source of Income:   (Parental support.  Patient does odd jobs, such as babysitting.)  Financial Environmental Concerns:  none  Current Hobbies:  television/movies/videos, music, interaction with pets, social media/computer activities, games, spectator events  Barriers in Personal Life:  lack of motivation    Significant Clinical History  Current Anxiety Symptoms:  anxious  Current Depression/Trauma:  helplessness, hopelessness, thoughts of death/suicide  Current Somatic Symptoms:  anxious  Current Psychosis/Thought Disturbance:  None reported, none observed.    Current Eating Symptoms:  Patient snacks.     Chemical Use History:    Alcohol: None  Benzodiazepines: None  Opiates: None  Cocaine: None  Marijuana: None  Other Use: None     Past diagnosis: Adjustment Disorder with Anxious Mood    Family history:  Anxiety Disorder, Depression, Death by Suicide  Past treatment:  No known formal treatment attempts  Details of most recent treatment:       Other relevant history:  Patient lives at home with his parents and brother. He shares a bedroom with his brother. Patient graduated high school. He did not continue his education, nor has he been employed for at least ten years. He explained that he hasn't \"found what I'm looking for\" yet.  Patient is supported by his parents. 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 Patient " "describes a sedentary lifestyle. He plays video games and watches videos until about 3:00 AM nightly. His brother's cat wakes them both 3-4 times between about 3:00-6:00 AM wanting food. Patient drinks tea (with caffeine) and snacks during the night.  Patient has trouble getting comfortable for sleep due to back and neck pain. He does not get physical activity nor exercise. Patient is single with no children.    Have there been any medication changes in the past two weeks:  no       Is the patient compliant with medications:   (He was prescribed hydroxyzine on 3/15/2025.)        Collateral Information  Is there collateral information:  No     Collateral information name, relationship, phone number:       What happened today:       What is different about patient's functioning:       What do you think the patient needs:      Has patient made comments about wanting to kill themselves/others:      If d/c is recommended, can they take part in safety/aftercare planning:    Additional collateral information:        Risk Assessment  [place] Suicide Severity Rating Scale Full Clinical Version: 3/15/2025  Suicidal Ideation  Q6 Suicide Behavior (Lifetime): no    [place] Suicide Severity Rating Scale Recent: 3/16/2025  Suicidal Ideation (Recent)  Q1 Wished to be Dead (Past Month): no  Q2 Suicidal Thoughts (Past Month): no  Q3 Suicidal Thought Method: no  Q4 Suicidal Intent without Specific Plan: no  Q5 Suicide Intent with Specific Plan: no  If yes to Q6, within past 3 months?: no  Level of Risk per Screen: moderate risk  Intensity of Ideation (Recent)  Most Severe Ideation Rating (Past 1 Month): 2  Description of Most Severe Ideation (Past 1 Month): Patient was prescribed new medication to aid sleep. When that didn't work, he felt hopeless and thought \"I can't take this anymore\". He denies plan or intent.  Frequency (Past 1 Month): Less than once a week  Duration (Past 1 Month): Fleeting, few seconds or " minutes  Controllability (Past 1 Month): Can control thoughts with little difficulty  Deterrents (Past 1 Month): Deterrents definitely stopped you from attempting suicide  Reasons for Ideation (Past 1 Month): Mostly to end or stop the pain (You couldn't go on living with the pain or how you were feeling)  Suicidal Behavior (Recent)  Actual Attempt (Past 3 Months): No  Has subject engaged in non-suicidal self-injurious behavior? (Past 3 Months): No  Interrupted Attempts (Past 3 Months): No  Aborted or Self-Interrupted Attempt (Past 3 Months): No  Preparatory Acts or Behavior (Past 3 Months): No    Environmental or Psychosocial Events: unemployment/underemployment, neither working nor attending school  Protective Factors: Protective Factors: strong bond to family unit, community support, or employment, sense of belonging, reality testing ability, help seeking, lives in a responsibly safe and stable environment, responsibilities and duties to others, including pets and children, able to access care without barriers    Does the patient have thoughts of harming others? Feels Like Hurting Others: no  Previous Attempt to Hurt Others: no  Current presentation:  (Calm, cooperative, anxious)  Is the patient engaging in sexually inappropriate behavior?: no  Does Patient have a known history of aggressive behavior: No    Is the patient engaging in sexually inappropriate behavior?  no        Mental Status Exam   Affect: Appropriate  Appearance: Appropriate  Attention Span/Concentration: Attentive  Eye Contact: Engaged    Fund of Knowledge: Appropriate   Language /Speech Content: Fluent  Language /Speech Volume: Normal  Language /Speech Rate/Productions: Normal  Recent Memory: Intact  Remote Memory: Intact  Mood: Normal  Orientation to Person: Yes   Orientation to Place: Yes  Orientation to Time of Day: Yes  Orientation to Date: Yes     Situation (Do they understand why they are here?): Yes  Psychomotor Behavior: Normal  Thought  Content: Clear  Thought Form: Intact, Goal Directed        Medication  Psychotropic medications:   Medication Orders - Psychiatric (From admission, onward)      None             Current Care Team  Patient Care Team:  Clinic, [facility] as PCP - Fauzia Tinajero APRN CNP as Assigned PCP    Diagnosis  Patient Active Problem List   Diagnosis Code    Acne L70.9    Flat feet M21.41, M21.42    CARDIOVASCULAR SCREENING; LDL GOAL LESS THAN 160 Z13.6    Impacted cerumen H61.20    Scabies B86    Adjustment disorder with anxious mood F43.22       Primary Problem This Admission  Active Hospital Problems    Adjustment disorder with anxious mood      Clinical Summary and Substantiation of Recommendations   Clinical Substantiation:  After therapeutic assessment, intervention, and aftercare planning by ED care team, writer, and in consultation with Dr. Conti, the patient's circumstances and mental state were appropriate for outpatient management. It is the recommendation of this writer that patient discharge to home with outpatient mental health support, a safety plan, and instructions for developing healthy sleep hygiene habits. Patient would benefit from establishing regular hours of sleep, regular physical activity schedule, and avoiding computer screen time, caffeinated tea, and snacks during the night. He was referred to Dr. Conti for questions about the hydroxyzine he was prescribed 3/15/2025. His safety plan was updated. And patient agreed to follow up with referrals for mental health providers which he was given during 3/15/2025 DEC Assessment.    Goals for crisis stabilization:  Discharge.    Next steps for Care Team:  Discharge    Treatment Objectives Addressed:  rapport building, orienting the patient to therapy, assessing safety, identifying treatment goals, identifying and practicing coping strategies, identifying additional supports, safety planning, identifying an appropriate aftercare  plan    Therapeutic Interventions:  Engaged in safety planning, Coached on coping techniques/relaxation skills to help improve distress tolerance and managing intense emotions., Reviewed healthy living that supports positive mental health, including looking at sleep hygiene, regular movement, nutrition, and regular socialization.    Has a specific means been identified for suicidal/homicide actions: No    If yes, describe:       Explain action steps toward mitigation:       Document completion of mitigation actions:       The follow up action still needed prior to discharge:       Patient coping skills attempted to reduce the crisis:  Presented to the ED.    Disposition  Recommended referrals: Individual Therapy, Medication Management        Reviewed case and recommendations with attending provider. Attending Name: Dr. Conti       Attending concurs with disposition: yes       Patient and/or validated legal guardian concurs with disposition:  yes       Final disposition:  discharge        Legal status: Voluntary/Patient has signed consent for treatment                                                      Reviewed court records: yes (Patient is his own decision maker.)       Assessment Details   Total duration spent with the patient: 35 min     CPT code(s) utilized: 16733 - Psychotherapy for Crisis - 60 (30-74*) min    Meenakshi Ponce LP, Psychotherapist  [facility] - Triage & Transition Services  Callback: [phone]

## 2025-03-16 NOTE — DISCHARGE INSTRUCTIONS
Maintain good sleep hygiene habits.  Eat healthy foods.  Get exercise.  Avoid caffeinated beverages, including tea, especially in the evening, or anytime after dinner.  Avoid excessive TV/video game.  This will all help you sleep better.

## 2025-03-16 NOTE — ED PROVIDER NOTES
ED Provider Note  [facility]      History     Chief Complaint   Patient presents with    Suicidal     HPI  Berny Fowler is a 34 year old male who has medical history significant for adjustment disorder with anxious mood, presenting the emergency department with concerns of not feeling safe at home.  Patient was just seen in the emergency department, and ultimately discharged home with hydroxyzine.  Patient now tells me that he is unable to manage and feel safe at home as he feels as if he will harm himself because of the pain he is experiencing diffusely throughout his body, especially in the back, knees, and legs.  Has taken 2 tablets of ibuprofen twice daily for the past 2 days, in addition to Tylenol twice daily.  No other pain medications have been taken.  He states he has not slept since Tuesday, 4 days ago.  No fever.  Was seen by providers earlier today, and I did review the that workup, and patient ultimately deemed medically cleared, and safe for discharge home after seeing DEC as well.        Independent Historian:        Review of External Notes:          Allergies:  No Known Allergies    Problem List:    Patient Active Problem List    Diagnosis Date Noted    Adjustment disorder with anxious mood 03/15/2025     Priority: Medium    Impacted cerumen 09/26/2012     Priority: Medium    Scabies 09/26/2012     Priority: Medium    Acne 08/04/2011     Priority: Medium    Flat feet 08/04/2011     Priority: Medium    CARDIOVASCULAR SCREENING; LDL GOAL LESS THAN 160 09/26/2012     Priority: Low        Past Medical History:    Past Medical History:   Diagnosis Date    Undescended testis        Past Surgical History:    Past Surgical History:   Procedure Laterality Date    NO HISTORY OF SURGERY         Family History:    Family History   Problem Relation Age of Onset    C.A.SHONA. Father         MI at age 42 with stent placement    Hypertension Father     Lipids Father     C.AYNES. Maternal  Grandfather     KOREYAYNES. Paternal Grandfather     Allergies Sister     Cancer Paternal Grandmother         bone     Melanoma No family hx of        Social History:  Marital Status:  Single [1]  Social History     Tobacco Use    Smoking status: Never    Smokeless tobacco: Never   Vaping Use    Vaping status: Never Used   Substance Use Topics    Alcohol use: Yes     Comment: social     Drug use: No        Medications:    cyclobenzaprine (FLEXERIL) 10 MG tablet  fluticasone (FLONASE) 50 MCG/ACT nasal spray  hydrOXYzine HCl (ATARAX) 25 MG tablet  naproxen (NAPROSYN) 500 MG tablet  OLANZapine (ZYPREXA) 10 MG tablet  OLANZapine (ZYPREXA) 5 MG tablet          Review of Systems  A medically appropriate review of systems was performed with pertinent positives and negatives noted in the HPI, and all other systems negative.    Physical Exam   Patient Vitals for the past 24 hrs:   BP Temp Temp src Pulse Resp SpO2   03/15/25 2159 (!) 151/100 98.9  F (37.2  C) Oral 85 18 98 %          Physical Exam  General: alert and in no acute distress on arrival  Head: atraumatic, normocephalic  Lungs:  nonlabored  CV:  extremities warm and perfused  Abd: nondistended  Skin: no rashes, no diaphoresis and skin color normal  Neuro: Patient awake, alert, speech is fluent,   Psychiatric:.  Denies specific suicidal plan.  Does have fleeting thoughts of self-harm and not wanting to suffer from pain.      ED Course                 Procedures                 None         No results found for this or any previous visit (from the past 24 hours).    MEDICATIONS GIVEN IN THE EMERGENCY DEPARTMENT:  Medications   acetaminophen (TYLENOL) tablet 1,000 mg (1,000 mg Oral $Given 3/15/25 1921)   ketorolac (TORADOL) injection 30 mg (30 mg Intramuscular $Given 3/15/25 2313)           Independent Interpretation (X-rays, CTs, rhythm strip):  None    Consultations/Discussion of Management or Tests:  Lashawn FINCH        Social Determinants of Health affecting  care:  None       Assessments & Plan (with Medical Decision Making)  34 year old male who presents to the Emergency Department for evaluation of worsening depression.  Patient was seen earlier today, and also seen by DEC.  Has history of insomnia, and worsening insomnia to the point that patient has experienced increased amounts of pain, causing him inability to sleep at night, and this is causing him to feel suicidal.    I conversation with DEC , Sara.  We discussed patient's presentation, and also discussed the recent visit earlier today.  Both she and I are in agreement that patient would not benefit from inpatient psychiatric stabilization.  Patient would benefit from further good sleep hygiene patterns and habits.  With regards to the pain.  There has been no recent trauma, fall, or injury.  Patient has been mostly sedentary, playing video games throughout much of the afternoon, and evening hours, going to bed around 3 AM, and occasionally sleeping until 2 AM.  He is snacking late at night, and drinking caffeinated beverages.  Therefore, conversation was had both with DEC , in addition to myself with regards to sleep hygiene patterns.    With regards to the pain, patient does have physical therapy referral from previous.  I have recommended Tylenol, and ibuprofen.  With regards to sleep medications, I do not feel there is indication for sleep medicine at this point.  He does have hydroxyzine which should help.  However, would avoid additional prescription sleep medications until sleep hygiene interventions are performed.    Patient also does have outpatient therapist referrals.  He is deemed safe for discharge home.       I have reviewed the nursing notes.    I have reviewed the findings, diagnosis, plan and need for follow up with the patient.         Medical Decision Making  The patient's presentation was of low complexity (a stable chronic illness).    The patient's evaluation  involved:  review of external note(s) from 1 sources (see separate area of note for details)  discussion of management or test interpretation with another health professional (see separate area of note for details)    The patient's management necessitated only low risk treatment.        NEW PRESCRIPTIONS STARTED AT TODAY'S ER VISIT  New Prescriptions    No medications on file       Final diagnoses:   Other insomnia   Depression, unspecified depression type       3/15/2025   [facility] EMERGENCY DEPT       Jeffry Conti MD  03/16/25 0324

## 2025-03-16 NOTE — ED TRIAGE NOTES
"Pt arrives dropped off by his father, pt reports feeling suicidal. Pt was just seen here in our ED. Pt states that he went home and took his normal prescribed dose of sleeping medication and couldn't sleep. His suicidal thoughts worsened and felt \"I just wanted to walk off somewhere and dehydrate myself until I die\" \"I just want to die\" Pt reports chronic pain in the knees, neck, back, and legs that causes him to not be able to sleep.         "

## 2025-03-17 ENCOUNTER — TELEPHONE (OUTPATIENT)
Dept: BEHAVIORAL HEALTH | Facility: CLINIC | Age: 35
End: 2025-03-17

## 2025-03-17 ENCOUNTER — TELEPHONE (OUTPATIENT)
Dept: BEHAVIORAL HEALTH | Facility: CLINIC | Age: 35
End: 2025-03-17
Payer: COMMERCIAL

## 2025-03-17 ENCOUNTER — PATIENT OUTREACH (OUTPATIENT)
Dept: CARE COORDINATION | Facility: CLINIC | Age: 35
End: 2025-03-17

## 2025-03-17 DIAGNOSIS — R69 DIAGNOSIS DEFERRED: Primary | ICD-10-CM

## 2025-03-17 DIAGNOSIS — F43.22 ADJUSTMENT DISORDER WITH ANXIOUS MOOD: Primary | ICD-10-CM

## 2025-03-17 NOTE — TELEPHONE ENCOUNTER
Called clinic. Staff will send message to [facility] to call back as staff do not have a direct line.    Attempted first number below, no answer and busy signal. Unable to leave .    Will await return call from [facility].    Parker EMERY RN  [facility]

## 2025-03-17 NOTE — TELEPHONE ENCOUNTER
Spoke with pt who reported not needing further support RE: DEC follow up. Writer informed pt they can call back in the future if needed.

## 2025-03-17 NOTE — TELEPHONE ENCOUNTER
----- Message from Amber CHRISTIE sent at 3/17/2025 12:05 PM CDT -----  Regarding: Psychiatry Referral  Transition Clinic Referral   [place]       Please Check Type of Referral Requested:       ____THERAPY: The Transition clinic is able to schedule patients.      X MEDICATION:   Referrals for Medication are ONLY accepted from the following areas (select): Emergency Department/Urgent Care - HIGH PRIORITY                                      Suboxone and Opioid Management Referrals are automatically denied.   TC Psychiatry cannot see patient without active medical insurance.   Next level of psychiatry care must be within 12 months.  TC Psychiatry cannot accept patient with next level of care scheduled with PCP.  The transition clinic cannot follow patients who are on a restricted recipient program.      Referring Provider Contact Name: Amber James; Phone Number: [phone]    Reason for Transition Clinic Referral: Pt discharged from ED and requesting psychiatry services. Initially he declined, then called back to schedule.     Next Level of Care Patient Will Be Transitioned To: TBD, needs to establish care.    What Would Be Helpful from the Transition Clinic: Please see initial DEC Consult. Pt does not currently have a provider for medications.     Requesting to schedule in collaboration with patient (via Teams): Yes Where is the patient located?          Needs:NO    Does Patient Have Access to Technology: Yes    Patient E-mail Address: [email or web address]    Current Patient Phone Number: [phone];     Clinician Gender Preference (if applicable): NO    Patient location preference:[place]    (Master Form: Updated 11/28/2023)

## 2025-03-17 NOTE — TELEPHONE ENCOUNTER
Upon referral from [facility]/ED program, Peer  placed a call to the patient to provide information on [facility] program services following recent admission to hospital.  PRS received voicemail and left a message welcoming callback to [phone] between the hours of 8:00 am to 4 :00 pm, Monday - Friday.     Sherif Nuñez  Peer   [facility]   [phone]

## 2025-03-17 NOTE — TELEPHONE ENCOUNTER
Reached patient. Explained TC services and reviewed next LOC. Patient stated no long term provider, stated would like to see provider within [place]. 9035 order placed. Initial Medication Management appointment scheduled 4/1/2025.    Colleen Cain  Transition Clinic Coordinator  03/17/25 12:40 PM

## 2025-03-17 NOTE — TELEPHONE ENCOUNTER
Triage and Transition Services- Patient Follow Up Call  Service Line Making Phone Call: Telemedicine    Who did Writer Talk to: Patient    Details of Call: pt called back, asking to schedule psychiatry appointment. Reviewed options with pt, pt agreeable to referral to the  Transition Clinic. Provided contacted information to the clinic and made referral for medication management services.     Amber Ocasio 3/17/2025 12:10 PM

## 2025-03-17 NOTE — PROGRESS NOTES
[facility] Care Resource [place]  Community Health Worker Initial Outreach    CHW Initial Information Gathering:  Referral Source: ED Follow-Up  CHW Additional Questions  MyChart active?: No    Patient accepts CC: No, patient declined at this time. Patient will be sent Care Coordination introduction letter for future reference.       Kristen Gallegos  Community Health Worker  [facility] Care Resource [facility]    *Connected Care Resource Team does NOT follow patient ongoing. Referrals are identified based on internal discharge reports and the outreach is to ensure patient has an understanding of their discharge instructions.

## 2025-03-18 ENCOUNTER — THERAPY VISIT (OUTPATIENT)
Dept: PHYSICAL THERAPY | Facility: CLINIC | Age: 35
End: 2025-03-18
Attending: EMERGENCY MEDICINE
Payer: COMMERCIAL

## 2025-03-18 DIAGNOSIS — G47.09 OTHER INSOMNIA: ICD-10-CM

## 2025-03-18 PROCEDURE — 97110 THERAPEUTIC EXERCISES: CPT | Mod: GP | Performed by: PHYSICAL THERAPIST

## 2025-03-18 PROCEDURE — 97161 PT EVAL LOW COMPLEX 20 MIN: CPT | Mod: GP | Performed by: PHYSICAL THERAPIST

## 2025-03-18 PROCEDURE — 97530 THERAPEUTIC ACTIVITIES: CPT | Mod: GP | Performed by: PHYSICAL THERAPIST

## 2025-03-18 NOTE — PROGRESS NOTES
PHYSICAL THERAPY EVALUATION  Type of Visit: Evaluation        Fall Risk Screen:  Fall screen completed by: PT  Have you fallen 2 or more times in the past year?: No  Have you fallen and had an injury in the past year?: No  Is patient a fall risk?: No    Subjective   Pt woke up with neck pain after sleeping on his stomach with his head turned to the right, patient stiffness and jaw pain when he's chewing a lot.  Pt has tightness into right upper trap area.  Pt notes tightness/soreness in mid to low back worse with bending forward.  This came on with sleeping on his stomach.  Pt notes having L knee pain, pt reports this comes on when sitting cross-legged.  Pt notes pain on lateral knee when transferring from sitting to standing.  Knee pain has been present for about a week and a half.  Pt has had difficulty sleeping due to pain confounded with psychology concerns per chart review.            Presenting condition or subjective complaint: back, neck, shoulder, leg pain  Date of onset: 03/04/25    Relevant medical history: Neck injury   Dates & types of surgery:      Prior diagnostic imaging/testing results:       Prior therapy history for the same diagnosis, illness or injury: No        Living Environment  Social support: With family members   Type of home: Apartment/condo   Stairs to enter the home: Yes 1 Is there a railing: Yes     Ramp: No   Stairs inside the home: No       Help at home:    Equipment owned:       Employment: No    Hobbies/Interests: walking biking fishing    Patient goals for therapy: walking, biking, fishing    Pain assessment: Pain present     Objective   LUMBAR SPINE EVALUATION  PAIN: Pain Level at Rest: 0/10  Pain Level with Use: 3/10  Pain Location: lumbar spine  Pain Quality: Aching  Pain Frequency: intermittent  Pain is Worst: bending.  Pain is Exacerbated By: bending.   Pain is Relieved By: rest  Pain Progression: Improved  INTEGUMENTARY (edema, incisions): WNL  POSTURE: Sitting Posture:  Lordosis decreased  GAIT:   Weightbearing Status: WBAT  Assistive Device(s): None  Gait Deviations: WNL  BALANCE/PROPRIOCEPTION:  Independent in standing  WEIGHTBEARING ALIGNMENT: WNL  NON-WEIGHTBEARING ALIGNMENT: WNL   ROM:  Stiff lumbar extension to 20%, otherwise WNL.   PELVIC/SI SCREEN: WNL  STRENGTH: WNL Painful knee extension and flexion on L.   MYOTOMES: WNL  DTR S: WNL  CORD SIGNS: WNL  DERMATOMES: WNL  NEURAL TENSION: Lumbar WNL  FLEXIBILITY:  Tight hip flexors, hamstrings.  PALPATION: Tender distal L biceps femoris tendon and IT band insertion.      CERVICAL SPINE EVALUATION  PAIN: Pain Level at Rest: 2/10  Pain Level with Use: 6/10  Pain Location: cervical spine  Pain Quality: Aching and Nagging  Pain Frequency: intermittent  Pain is Worst: turning head, prolonged sitting.   Pain is Exacerbated By: head movement.    Pain is Relieved By: rest  Pain Progression: Unchanged  INTEGUMENTARY (edema, incisions): WNL  POSTURE: Sitting Posture: Rounded shoulders, Forward head  ROM:  Extension WNL, flexion WNL but stiff, rotation 55 degrees B, sidebend 20 degrees B.   MYOTOMES: WNL  DTR S: WNL  CORD SIGNS: WNL  DERMATOMES: WNL  NEURAL TENSION: Cervical WNL  FLEXIBILITY:  Tight upper trap, levator scapulae, and SCM.     SPECIAL TESTS:  negative spurlings, distraction, and ULTT.   PALPATION:  B upper trap and SCM tender to palpation.       Assessment & Plan   CLINICAL IMPRESSIONS  Medical Diagnosis: Insomnia    Treatment Diagnosis: Neck pain, low back pain, L knee pain   Impression/Assessment: Patient is a 34 year old male with Neck, back, and knee complaints.  The following significant findings have been identified: Pain, Decreased ROM/flexibility, Decreased joint mobility, Decreased strength, Impaired balance, Decreased proprioception, Impaired muscle performance, and Decreased activity tolerance. These impairments interfere with their ability to perform self care tasks and recreational activities as compared to  previous level of function.     Clinical Decision Making (Complexity):  Clinical Presentation: Stable/Uncomplicated  Clinical Presentation Rationale: based on medical and personal factors listed in PT evaluation  Clinical Decision Making (Complexity): Low complexity    PLAN OF CARE  Treatment Interventions:  Interventions: Gait Training, Manual Therapy, Neuromuscular Re-education, Therapeutic Activity, Therapeutic Exercise    Long Term Goals     PT Goal 1  Goal Identifier: Cervical ROM  Goal Description: Pt will demonstrate cervical rotation to 70 degrees or better bilaterally in order to improve neck movement for dressing.  Target Date: 04/29/25  PT Goal 2  Goal Identifier: Sitting  Goal Description: Pt will report no increase in back pain with sitting for 30 minutes at a time in order to tolerate sitting as a resting position in the evening.  Target Date: 04/29/25  PT Goal 3  Goal Identifier: Sleep  Goal Description: Pt will report ability to sleep 6 hours without waking due to neck, back, or knee pain in order to rested for the day.  Target Date: 05/13/25      Frequency of Treatment: 1x/week  Duration of Treatment: 8 weeks    Recommended Referrals to Other Professionals: None.  Education Assessment:   Learner/Method: Patient  Education Comments: HEP    Risks and benefits of evaluation/treatment have been explained.   Patient/Family/caregiver agrees with Plan of Care.     Evaluation Time:     PT Eval, Low Complexity Minutes (39935): 30     Signing Clinician: Alberto Salazar PT        [facility]                                                                                   OUTPATIENT PHYSICAL THERAPY      PLAN OF TREATMENT FOR OUTPATIENT REHABILITATION   Patient's Last Name, First Name, Berny Granados YOB: 1990   Provider's Name   [facility]   Medical Record No.  [id number]     Onset Date: 03/04/25  Start of Care Date:  03/18/25     Medical Diagnosis:  Insomnia      PT Treatment Diagnosis:  Neck pain, low back pain, L knee pain Plan of Treatment  Frequency/Duration: 1x/week/ 8 weeks    Certification date from 03/18/25 to 05/13/25         See note for plan of treatment details and functional goals     Alberto Salazar, PT                         I CERTIFY THE NEED FOR THESE SERVICES FURNISHED UNDER        THIS PLAN OF TREATMENT AND WHILE UNDER MY CARE     (Physician attestation of this document indicates review and certification of the therapy plan).              Referring Provider:  Jacqueline Rolon    Initial Assessment  See Epic Evaluation- Start of Care Date: 03/18/25

## 2025-03-20 ENCOUNTER — TELEPHONE (OUTPATIENT)
Dept: BEHAVIORAL HEALTH | Facility: CLINIC | Age: 35
End: 2025-03-20
Payer: COMMERCIAL

## 2025-03-20 DIAGNOSIS — R69 DIAGNOSIS DEFERRED: Primary | ICD-10-CM

## 2025-03-20 NOTE — TELEPHONE ENCOUNTER
Per ED alert, Peer  contacted the patient to discuss [facility] program.  PRS spoke with pt who advised having established contact with [facility] in [facility] to begin services.  Pt reports having an assessment scheduled with this organization on April 1, 2025.  Pt agreed to PRS forwarding [facility] program info via [employer] for consideration in the future, as needed.     Sherif Nuñez  Peer   [facility]  Direct: [phone]

## 2025-03-25 ENCOUNTER — THERAPY VISIT (OUTPATIENT)
Dept: PHYSICAL THERAPY | Facility: CLINIC | Age: 35
End: 2025-03-25
Attending: EMERGENCY MEDICINE
Payer: COMMERCIAL

## 2025-03-25 DIAGNOSIS — G47.09 OTHER INSOMNIA: Primary | ICD-10-CM

## 2025-03-25 PROCEDURE — 97110 THERAPEUTIC EXERCISES: CPT | Mod: GP | Performed by: PHYSICAL THERAPIST

## 2025-03-26 ENCOUNTER — OFFICE VISIT (OUTPATIENT)
Dept: FAMILY MEDICINE | Facility: CLINIC | Age: 35
End: 2025-03-26
Payer: COMMERCIAL

## 2025-03-26 VITALS
HEART RATE: 93 BPM | RESPIRATION RATE: 16 BRPM | BODY MASS INDEX: 24.5 KG/M2 | HEIGHT: 69 IN | DIASTOLIC BLOOD PRESSURE: 80 MMHG | WEIGHT: 165.4 LBS | OXYGEN SATURATION: 98 % | SYSTOLIC BLOOD PRESSURE: 138 MMHG | TEMPERATURE: 97.3 F

## 2025-03-26 DIAGNOSIS — M21.42 FLAT FEET: ICD-10-CM

## 2025-03-26 DIAGNOSIS — K76.0 NAFLD (NONALCOHOLIC FATTY LIVER DISEASE): ICD-10-CM

## 2025-03-26 DIAGNOSIS — Z11.59 NEED FOR HEPATITIS C SCREENING TEST: ICD-10-CM

## 2025-03-26 DIAGNOSIS — Z00.00 ROUTINE GENERAL MEDICAL EXAMINATION AT A HEALTH CARE FACILITY: Primary | ICD-10-CM

## 2025-03-26 DIAGNOSIS — M21.41 FLAT FEET: ICD-10-CM

## 2025-03-26 DIAGNOSIS — Z11.4 SCREENING FOR HIV (HUMAN IMMUNODEFICIENCY VIRUS): ICD-10-CM

## 2025-03-26 LAB
ALBUMIN SERPL BCG-MCNC: 4.5 G/DL (ref 3.5–5.2)
ALP SERPL-CCNC: 75 U/L (ref 40–150)
ALT SERPL W P-5'-P-CCNC: 56 U/L (ref 0–70)
ANION GAP SERPL CALCULATED.3IONS-SCNC: 13 MMOL/L (ref 7–15)
AST SERPL W P-5'-P-CCNC: 27 U/L (ref 0–45)
BILIRUB SERPL-MCNC: 0.4 MG/DL
BUN SERPL-MCNC: 13.9 MG/DL (ref 6–20)
CALCIUM SERPL-MCNC: 9.5 MG/DL (ref 8.8–10.4)
CHLORIDE SERPL-SCNC: 105 MMOL/L (ref 98–107)
CREAT SERPL-MCNC: 1.01 MG/DL (ref 0.67–1.17)
EGFRCR SERPLBLD CKD-EPI 2021: >90 ML/MIN/1.73M2
GLUCOSE SERPL-MCNC: 100 MG/DL (ref 70–99)
HCO3 SERPL-SCNC: 25 MMOL/L (ref 22–29)
HCV AB SERPL QL IA: NONREACTIVE
HIV 1+2 AB+HIV1 P24 AG SERPL QL IA: NONREACTIVE
POTASSIUM SERPL-SCNC: 4.6 MMOL/L (ref 3.4–5.3)
PROT SERPL-MCNC: 7.1 G/DL (ref 6.4–8.3)
SODIUM SERPL-SCNC: 143 MMOL/L (ref 135–145)

## 2025-03-26 PROCEDURE — 90472 IMMUNIZATION ADMIN EACH ADD: CPT | Performed by: FAMILY MEDICINE

## 2025-03-26 PROCEDURE — 36415 COLL VENOUS BLD VENIPUNCTURE: CPT | Performed by: FAMILY MEDICINE

## 2025-03-26 PROCEDURE — 86803 HEPATITIS C AB TEST: CPT | Performed by: FAMILY MEDICINE

## 2025-03-26 PROCEDURE — 90480 ADMN SARSCOV2 VAC 1/ONLY CMP: CPT | Performed by: FAMILY MEDICINE

## 2025-03-26 PROCEDURE — 87389 HIV-1 AG W/HIV-1&-2 AB AG IA: CPT | Performed by: FAMILY MEDICINE

## 2025-03-26 PROCEDURE — 99395 PREV VISIT EST AGE 18-39: CPT | Mod: 25 | Performed by: FAMILY MEDICINE

## 2025-03-26 PROCEDURE — 90656 IIV3 VACC NO PRSV 0.5 ML IM: CPT | Performed by: FAMILY MEDICINE

## 2025-03-26 PROCEDURE — 90715 TDAP VACCINE 7 YRS/> IM: CPT | Performed by: FAMILY MEDICINE

## 2025-03-26 PROCEDURE — 80053 COMPREHEN METABOLIC PANEL: CPT | Performed by: FAMILY MEDICINE

## 2025-03-26 PROCEDURE — 90471 IMMUNIZATION ADMIN: CPT | Performed by: FAMILY MEDICINE

## 2025-03-26 PROCEDURE — 91320 SARSCV2 VAC 30MCG TRS-SUC IM: CPT | Performed by: FAMILY MEDICINE

## 2025-03-26 PROCEDURE — 99213 OFFICE O/P EST LOW 20 MIN: CPT | Mod: 25 | Performed by: FAMILY MEDICINE

## 2025-03-26 SDOH — HEALTH STABILITY: PHYSICAL HEALTH: ON AVERAGE, HOW MANY DAYS PER WEEK DO YOU ENGAGE IN MODERATE TO STRENUOUS EXERCISE (LIKE A BRISK WALK)?: 1 DAY

## 2025-03-26 ASSESSMENT — SOCIAL DETERMINANTS OF HEALTH (SDOH): HOW OFTEN DO YOU GET TOGETHER WITH FRIENDS OR RELATIVES?: MORE THAN THREE TIMES A WEEK

## 2025-03-26 ASSESSMENT — PAIN SCALES - GENERAL: PAINLEVEL_OUTOF10: NO PAIN (0)

## 2025-03-26 NOTE — PATIENT INSTRUCTIONS
Patient Education   Preventive Care Advice   This is general advice given by our system to help you stay healthy. However, your care team may have specific advice just for you. Please talk to your care team about your preventive care needs.  Nutrition  Eat 5 or more servings of fruits and vegetables each day.  Try wheat bread, brown rice and whole grain pasta (instead of white bread, rice, and pasta).  Get enough calcium and vitamin D. Check the label on foods and aim for 100% of the RDA (recommended daily allowance).  Lifestyle  Exercise at least 150 minutes each week  (30 minutes a day, 5 days a week).  Do muscle strengthening activities 2 days a week. These help control your weight and prevent disease.  No smoking.  Wear sunscreen to prevent skin cancer.  Have a dental exam and cleaning every 6 months.  Yearly exams  See your health care team every year to talk about:  Any changes in your health.  Any medicines your care team has prescribed.  Preventive care, family planning, and ways to prevent chronic diseases.  Shots (vaccines)   HPV shots (up to age 26), if you've never had them before.  Hepatitis B shots (up to age 59), if you've never had them before.  COVID-19 shot: Get this shot when it's due.  Flu shot: Get a flu shot every year.  Tetanus shot: Get a tetanus shot every 10 years.  Pneumococcal, hepatitis A, and RSV shots: Ask your care team if you need these based on your risk.  Shingles shot (for age 50 and up)  General health tests  Diabetes screening:  Starting at age 35, Get screened for diabetes at least every 3 years.  If you are younger than age 35, ask your care team if you should be screened for diabetes.  Cholesterol test: At age 39, start having a cholesterol test every 5 years, or more often if advised.  Bone density scan (DEXA): At age 50, ask your care team if you should have this scan for osteoporosis (brittle bones).  Hepatitis C: Get tested at least once in your life.  STIs (sexually  transmitted infections)  Before age 24: Ask your care team if you should be screened for STIs.  After age 24: Get screened for STIs if you're at risk. You are at risk for STIs (including HIV) if:  You are sexually active with more than one person.  You don't use condoms every time.  You or a partner was diagnosed with a sexually transmitted infection.  If you are at risk for HIV, ask about PrEP medicine to prevent HIV.  Get tested for HIV at least once in your life, whether you are at risk for HIV or not.  Cancer screening tests  Cervical cancer screening: If you have a cervix, begin getting regular cervical cancer screening tests starting at age 21.  Breast cancer scan (mammogram): If you've ever had breasts, begin having regular mammograms starting at age 40. This is a scan to check for breast cancer.  Colon cancer screening: It is important to start screening for colon cancer at age 45.  Have a colonoscopy test every 10 years (or more often if you're at risk) Or, ask your provider about stool tests like a FIT test every year or Cologuard test every 3 years.  To learn more about your testing options, visit:   .  For help making a decision, visit:   https://bit.ly/xw88440.  Prostate cancer screening test: If you have a prostate, ask your care team if a prostate cancer screening test (PSA) at age 55 is right for you.  Lung cancer screening: If you are a current or former smoker ages 50 to 80, ask your care team if ongoing lung cancer screenings are right for you.  For informational purposes only. Not to replace the advice of your health care provider. Copyright   2023 [employer]. All rights reserved. Clinically reviewed by the [facility] Transitions Program. MyCare 984408 - REV 01/24.

## 2025-03-26 NOTE — PROGRESS NOTES
Preventive Care Visit  [facility]  ANTONELLA CHAVES MD, Family Medicine  Mar 26, 2025      Assessment & Plan     Routine general medical examination at a health care facility       Screening for HIV (human immunodeficiency virus)     - HIV Antigen Antibody Combo; Future  - HIV Antigen Antibody Combo    Need for hepatitis C screening test     - Hepatitis C Screen Reflex to HCV RNA Quant and Genotype; Future  - Hepatitis C Screen Reflex to HCV RNA Quant and Genotype    Flat feet     - Orthopedic  Referral; Future    NAFLD (nonalcoholic fatty liver disease)     - Comprehensive metabolic panel (BMP + Alb, Alk Phos, ALT, AST, Total. Bili, TP); Future  - Comprehensive metabolic panel (BMP + Alb, Alk Phos, ALT, AST, Total. Bili, TP)    Patient has been advised of split billing requirements and indicates understanding: Yes        Counseling  Appropriate preventive services were addressed with this patient via screening, questionnaire, or discussion as appropriate for fall prevention, nutrition, physical activity, Tobacco-use cessation, social engagement, weight loss and cognition.  Checklist reviewing preventive services available has been given to the patient.  Reviewed patient's diet, addressing concerns and/or questions.   He is at risk for lack of exercise and has been provided with information to increase physical activity for the benefit of his well-being.   The patient was instructed to see the dentist every 6 months.           Sana Arrieta is a 34 year old, presenting for the following:  Physical, Orders (Dentist wants imaging of liver due to NAFLD), Foot Problems (Referral for foot orthotics), and Imm/Inj (TDAP, Flu, COVID)        3/26/2025    10:44 AM   Additional Questions   Roomed by Noemi MCKENZIE  Pt reports h/o NAFLD.   He reports that he needs updated labs before having dental extraction.  Patient reports that he states from alcohol and minimizes fatty  foods.    History of flatfeet, needs updated orthotics     Chief Complaint   Patient presents with    Physical    Orders     Dentist wants imaging of liver due to NAFLD    Foot Problems     Referral for foot orthotics    Imm/Inj     TDAP, Flu, COVID             Advance Care Planning  Patient does not have a Health Care Directive: Discussed advance care planning with patient; however, patient declined at this time.        3/26/2025   General Health   How would you rate your overall physical health? (!) FAIR   Feel stress (tense, anxious, or unable to sleep) Not at all         3/26/2025   Nutrition   Three or more servings of calcium each day? Yes   Diet: Low fat/cholesterol   How many servings of fruit and vegetables per day? (!) 2-3   How many sweetened beverages each day? 0-1         3/26/2025   Exercise   Days per week of moderate/strenous exercise 1 day   (!) EXERCISE CONCERN      3/26/2025   Social Factors   Frequency of gathering with friends or relatives More than three times a week   Worry food won't last until get money to buy more No   Food not last or not have enough money for food? No   Do you have housing? (Housing is defined as stable permanent housing and does not include staying ouside in a car, in a tent, in an abandoned building, in an overnight shelter, or couch-surfing.) Yes   Are you worried about losing your housing? No   Lack of transportation? No   Unable to get utilities (heat,electricity)? No         3/26/2025   Dental   Dentist two times every year? (!) NO             Today's PHQ-2 Score:       3/21/2025     7:52 AM   PHQ-2 ( 1999 [employer])   Q1: Little interest or pleasure in doing things 1   Q2: Feeling down, depressed or hopeless 1   PHQ-2 Score 2    Q1: Little interest or pleasure in doing things Several days   Q2: Feeling down, depressed or hopeless Several days   PHQ-2 Score 2       Patient-reported         3/26/2025   Substance Use   Alcohol more than 3/day or more than 7/wk No   Do  "you use any other substances recreationally? No     Social History     Tobacco Use    Smoking status: Never     Passive exposure: Never    Smokeless tobacco: Never   Vaping Use    Vaping status: Never Used   Substance Use Topics    Alcohol use: Yes     Comment: social     Drug use: No             3/26/2025   One time HIV Screening   Previous HIV test? No         3/26/2025   STI Screening   New sexual partner(s) since last STI/HIV test? No         3/26/2025   Contraception/Family Planning   Questions about contraception or family planning No        Reviewed and updated as needed this visit by Provider                    Past Medical History:   Diagnosis Date    Undescended testis     CAME DOWN WITHOUT SURGERY         Review of Systems  Constitutional, HEENT, cardiovascular, pulmonary, gi and gu systems are negative, except as otherwise noted.     Objective    Exam  /80   Pulse 93   Temp 97.3  F (36.3  C) (Tympanic)   Resp 16   Ht 1.753 m (5' 9\")   Wt 75 kg (165 lb 6.4 oz)   SpO2 98%   BMI 24.43 kg/m     Estimated body mass index is 24.43 kg/m  as calculated from the following:    Height as of this encounter: 1.753 m (5' 9\").    Weight as of this encounter: 75 kg (165 lb 6.4 oz).    Physical Exam  GENERAL: alert and no distress  NECK: no adenopathy, no asymmetry, masses, or scars  RESP: lungs clear to auscultation - no rales, rhonchi or wheezes  CV: regular rate and rhythm, normal S1 S2, no S3 or S4, no murmur, click or rub, no peripheral edema  ABDOMEN: soft, nontender, no hepatosplenomegaly, no masses and bowel sounds normal  MS: no gross musculoskeletal defects noted, no edema        Signed Electronically by: ANTONELLA CHAVES MD    "

## 2025-03-27 ENCOUNTER — PATIENT OUTREACH (OUTPATIENT)
Dept: CARE COORDINATION | Facility: CLINIC | Age: 35
End: 2025-03-27
Payer: COMMERCIAL

## 2025-03-31 ENCOUNTER — PATIENT OUTREACH (OUTPATIENT)
Dept: CARE COORDINATION | Facility: CLINIC | Age: 35
End: 2025-03-31
Payer: COMMERCIAL

## 2025-03-31 ENCOUNTER — TELEPHONE (OUTPATIENT)
Dept: BEHAVIORAL HEALTH | Facility: CLINIC | Age: 35
End: 2025-03-31
Payer: COMMERCIAL

## 2025-03-31 NOTE — TELEPHONE ENCOUNTER
Appointment reminder voicemail.    Colleen Cain  Transition Clinic Coordinator  03/31/25 3:22 PM

## 2025-04-01 ENCOUNTER — OFFICE VISIT (OUTPATIENT)
Dept: BEHAVIORAL HEALTH | Facility: CLINIC | Age: 35
End: 2025-04-01
Payer: COMMERCIAL

## 2025-04-01 VITALS
DIASTOLIC BLOOD PRESSURE: 91 MMHG | WEIGHT: 171 LBS | SYSTOLIC BLOOD PRESSURE: 157 MMHG | HEART RATE: 95 BPM | OXYGEN SATURATION: 98 % | BODY MASS INDEX: 25.33 KG/M2 | HEIGHT: 69 IN

## 2025-04-01 DIAGNOSIS — G47.09 OTHER INSOMNIA: Primary | ICD-10-CM

## 2025-04-01 ASSESSMENT — PATIENT HEALTH QUESTIONNAIRE - PHQ9
10. IF YOU CHECKED OFF ANY PROBLEMS, HOW DIFFICULT HAVE THESE PROBLEMS MADE IT FOR YOU TO DO YOUR WORK, TAKE CARE OF THINGS AT HOME, OR GET ALONG WITH OTHER PEOPLE: NOT DIFFICULT AT ALL
SUM OF ALL RESPONSES TO PHQ QUESTIONS 1-9: 3
SUM OF ALL RESPONSES TO PHQ QUESTIONS 1-9: 3

## 2025-04-01 NOTE — Clinical Note
Shahzad Daniels, let me know if there's any questions or concerns related to the progress notes.  I did instruct Berny he can request refills of the Hydroxyzine 25 mg prn from you moving forward.  Sincerely,  Rachel GILMAN-CNP

## 2025-04-01 NOTE — PROGRESS NOTES
[facility], formerly [facility]      Mental Health & Addiction Service Line    Transition Clinic: Psychiatry Note  Medication Management              VISIT INFORMATION    Date:  2025     Number:  -Initial       Patient Identifying Information:  Legal name: Berny Fowler  Preferred name: Berny  : 1990    Participants:   -Patient  -Provider      On site/In person  Start: 12:34 pm  End:    1:17 pm        HPI      Copied/Pasted from 3/15/2025 DEC encounter:    Patient is a 34 year old male with a medical history significant for insomnia, history of episodes with worsening insomnia, history of inactivity presenting for evaluation of difficulty sleeping secondary to neck pain and left knee pain.  He reports that he woke up 1 morning and he had some stiffness in his neck.  This was after he slept on his face.  He also notes that he sits with his legs crossed piquantly and this causes knee pain for him.  He plays video games until 3 in the morning frequently.  He notes that he came to the emergency department and was given some muscle relaxers for his knee pain and his neck pain however these have not helped.  He notes that he is now no longer able to sleep at night at all because of his pain.  Has not done physical therapy.  He has taken ibuprofen 600 mg which does not help.      He reports that in the past when he is struggled with insomnia he came to the emergency department and he was given IV Zyprexa which helped him rest.  He was also discharged with hydroxyzine.  He notes that this point his insomnia is so severe that is causing him to feel suicidal.  He has not attempted suicide nor has he done any self-harm.  He does not have a plan but he states that he does feel suicidal because the insomnia is so severe.  No recent car accidents or other traumatic incidences.    Patient denies any history of previous mental health concerns or mental health therapies.  No history of suicidal ideation, suicide attempts,  "SIB, HI, substance abuse.  Patient feels that his thoughts of suicide are not \"I want to die\" more of \"I cannot live like this much longer\".  He does express hope for the future and feeling as though his mental health can improve with sleep and sleeping medication.          Copied/Pasted from 3/15 to 3/16/2025  DEC encounter:    Patient presents with anxiety and insomnia. He reports not having slept for four nights. Patient came to the ED yesterday (3/15) for same and was discharged to home with hydroxyzine to aid sleep. During the evening, patient took the hydroxyzine and was still unable to sleep, so he returned to the ED.     Patient initially alluded to suicidal ideation on admission. On interview, patient clarified that he does not have a suicide plan nor intent. He explained that, when still unable to sleep with hydroxyzine, he felt hopeless and frustrated. He briefly thought of \"giving up\".     Patient reports neck and back pain. He stated that he has an appointment for physical therapy. Patient was assessed by DEC during his 3/15/2025 ED admission. At that time, he completed a safety plan and was discharged to home with referrals for therapy and psychiatry.     -----------------------    -Stay with parents  -Mostly get along with them  -Don't currently work; have been unemployed since 2014 due to feet issues  -Haven't been job searching but am going to start working with [employer] for job training    -The pain in neck + both knees feels better since discharge           PSYCHIATRIC ROS    Sleep:   -Average hours per night: 12 hours  -Go to bed: 5 am   -Get up: 5 pm    -------------    -Up at night watching videos and or playing video games    -------------    -The Hydroxyzine 25 mg prn HS is helpful for insomnia      Appetite/Weight Changes:   -Denies unintentional loss or gain > 10 lbs in the past 4 weeks      Energy Levels:   -8 to 10/10  -Now that I'm sleeping again      Trauma hx:   -Denies p/e/s abuse in " childhood or adult relationships      Depression/Anxiety:   -See PHQ-9 score    -See ZAY-7 score       Suicidal ideations:   -Denies at this time      SIB:  -Denies hx or current engagement of self harm       Side effects:  -None reported         MENTAL HEALTH HISTORY      Individual therapy or IOP:   -None reported or per chart review    Suicide attempts:  -None reported or per chart review    Inpatient psychiatric hospitalizations:  -None reported or per chart review  -No hx of commitments       ECT/TMS:  -None reported         Medication Trials:    Antipsychotics:  -Zyprexa 10 mg at bedtime: ineffective for insomnia    Benzodiazepines:  -Xanax .5 mg prn anxiety          SUBSTANCE USE    Prior use:  -Denies any history of alcohol, recreational, or illicit substance use resulting in: legal issues, c/d programming, or withdrawal symptoms        Current use:    Alcohol:   -1 drink per sitting during special occasions like [place] or [place]      Recreational Drugs:   -THC in high school but then I stopped because it made me hyper-focused on breathing      Medical Marijuana:  -None reported       Cigarettes per day:   -Have tried but don't regularly smoke      Chewing tobacco:   -None reported      Chewless tobacco/Nicotine pouches:  -None reported       Vaping:    -None reported       Caffeine intake per day:    -Energy drinks = 1 or 2 times per week        SOCIAL HISTORY  -Has been reviewed within the Electronic Medical Record/EMR        MEDICAL HISTORY    Current:  -The problem list was reviewed prior to the appointment  -The patient denies any concerning physical and or medical symptoms during the interviewing process      Developmental:   -Mother had normal pregnancy + delivery: Yes with the exception of via    -Met age appropriate milestones: Yes  -Participated in special education classes and or had an IEP: Yes, had extra tutoring in math  -Current or hx of: ADHD, ASD, learning disability, and  or other cognitive disorder: No      Neurological:  -Denies any hx of: concussions, or TBI  -1x during infancy = febrile seizure         MEDICATIONS      Current Outpatient Medications:     hydrOXYzine HCl (ATARAX) 25 MG tablet, Take 1 tablet (25 mg) by mouth nightly as needed for other (insomnia)., Disp: 60 tablet, Rfl: 1          If a controlled substance has been prescribed during the appointment:    -The [place] Prescription Monitoring Program has been reviewed and there are no current concerns with: diversionary activity, early refill requests, and or obtaining the medication from multiple providers.        VITALS    BP Readings from Last 3 Encounters:   03/26/25 138/80   03/21/25 130/80   03/15/25 (!) 151/100       Pulse Readings from Last 3 Encounters:   03/26/25 93   03/21/25 98   03/15/25 85       Wt Readings from Last 3 Encounters:   03/26/25 75 kg (165 lb 6.4 oz)   03/21/25 74.1 kg (163 lb 6.4 oz)   03/15/25 68 kg (150 lb)           SCALES    Answers submitted by the patient for this visit:  Patient Health Questionnaire (Submitted on 4/1/2025)  If you checked off any problems, how difficult have these problems made it for you to do your work, take care of things at home, or get along with other people?: Not difficult at all  PHQ9 TOTAL SCORE: 3      MENTAL STATUS EXAMINATION    Appearance: Well Groomed, Attire Appropriate for the Season  General Behavior:  Cooperative, Direct Eye Contact  Speech: Fluent, Normal rate and volume  Musculoskeletal:    -Gait not observed during t.h. visit  -No facial tics/tremors observed   -Motor coordination is grossly intact   Mood: Anxious  Affect: Congruent with mood  Attention: Intact  Orientation:  Person, Place, Time, Situation  Thought Associations:  Intact  Thought Content: Reality based   Thought Processes: Organized, Normal rate, [place]  Memory: No overt impairment; no screenings or formal testing performed  Language: Intact  Judgement: Good  Insight: Good          ASSESSMENT/CLINICAL IMPRESSIONS    Summary:    Berny Fowler is a 33 y/o male with no prior mental health history who recently went to the Emergency Department 2x on 3/15/2025 in the context of   neck + knee pain which contributed to a bout of insomnia (didn't sleep for 4 days; did also experience a severe insomnia episode 2 years ago) and   suicidal ideations.    Berny outlines since discharge he has been able to resume sleeping 12 hours at a time + he is finding Hydroxyzine 25 mg prn HS for insomnia to be   effective (he received additional scripts during: 3/21/2025 encounter per KOREY MORALEZ). He denies any current depressive (including suicidal ideations) or anxiety symptoms other than being slightly nervous due to being in a new clinic and meeting someone new.    For many years his sleep/wake pattern has been reversed.  He is up all night (from 5 pm to 5 am) and sleeps during the daytime.    Reviewed he can continue to obtain refills of Hydroxyzine prn per his new PCP (see 3/26/2025 encounter per Dr. AUDIE Daniels MD) and return to the ED   again if suicidal ideations + inability to maintain self safety returns.          DSM-V and or working diagnosis:      1.  Other insomnia       Rule outs:    2.  Circadian rhythm disorder             SAFETY EVALUATION:  Suicidal ideations:  -denies   Homicidal ideations:  -denies  Risk factors:  -male, single, some stressors  Protective and mitigating factors:  -parents  -no prior attempts  Risk assessment:  -low to medium      SAFETY PLAN:  -Has numbers of at least 1 family member + 1 friend in personal contact list  -Knows clinic number(s) + operation of regular business hours   -Reviewed to utilize 989 or text [place] to [phone] for mental health crisis  -Discussed to call 911 and or return to the nearest Emergency Department if unable to maintain safety of self or others (due to severity of suicidal and or homicidal ideations)          TREATMENT  PLAN      Medications:    Continue:  Hydroxyzine/Atarax 25 mg nightly as needed for sleep/insomnia       Labs:  -None Obtained        Therapy and or Non-pharmacological modalities:    Sleep:  -Get 7+ hours per night  -Avoid screens 60 minutes prior to bedtime    Nutrition:  -Anti-inflammatory diet: fruits, veggies, grains, plant proteins, lean animal protein (sparingly), dairy (small amounts)  -Omega 3's + fiber: food = first choice, supplements = second choice  -Avoid excessive amounts of: refined sugars + carbs, fried + fast foods    Activity:  -30 to 90 minutes (doesn't have to be consecutive) most days of the week  -Aerobic + strength/weight bearing  -Yoga + meditation/deep breathing          Disposition:  -Has been advised of consultative Transition Clinic model    -Can reschedule at T.C. as needed          Total time:  62 minutes per:    -Review of EMR including but not limited to: tabs within Chart Review + outside records if applicable   -Appointment time  -Documentation          Rachel GILMAN-CNP,  [facility]-BC          ----------------------------------------------------------------------------------------------------------------------        TREATMENT RISK STATEMENT    The risks, benefits, alternatives, and potential adverse effects have been explained and are understood by the patient.  The patient agrees to the treatment plan with their ability to do so.      The patient knows to call the clinic: [phone]  for any problems or concerns until the next psychiatry visit, regardless if it is within or outside of the [employer] system.     If unable to reach clinic staff (via phone call or medical messaging) during the normal business hours: 8:00 am to 4:30 pm then it is recommended accessing the nearest: emergency department, urgent care facility, or utilizing local (varies based on county of residence) and national crisis #'s or text messaging services for immediate  assistance.          ----------------------------------------------------------------------------------------------------------------------        If applicable the following has been discussed with the patient, parent/guardian, and or attending family member during the appointment:    Risks of polypharmacy and possible drug interactions with current medication list + common OTC products, herbs, and supplements.  Moving forward, it is suggested to intermittently check-in with a clinic or retail pharmacist whenever new medications or OTC/h/s are consumed.    Risks of polypharmacy and possible drug + drug interactions with current medication list.  Moving forward, it is suggested to intermittently check-in with a clinic or retail pharmacist whenever new prescription medications are added to your treatment regimen.    Recommendation to adhere to CDC guidelines as it relates to alcohol consumption.  If taking benzodiazepines, you should abstain from alcohol intake due to increased risks of CNS and respiratory depression, as well as psychomotor impairment.    If possible, it is recommended to avoid concurrent use of prescribed: opioids + benzodiazepines due to increased risks of CNS and respiratory depression, as well as the increased risk of overdose.     Recommendation to minimize and or abstain from THC use (unless you are prescribed medical marijuana).    Recommendation to abstain from illicit substances including but not limited to the following: heroin, street fentanyl, cocaine, methamphetamines, bath salts, and other synthetic products.    Recommendation to abstain from tobacco/smoking/nicotine, alcohol, THC, and all illicit substances if trying to become or are pregnant.    Do not take opioids, stimulants, and or other prescription medications unless they are specifically prescribed for you.     Black Box Warnings associated with the prescribed psychotropic(s).     Potential adverse effects of antipsychotics  including but not limited to the following: weight gain, metabolic syndrome, EPS/Tardive Dyskinesias, and Neuroleptic Malignant Syndrome.    Potential adverse effects of stimulants including but not limited to the following: sudden death, MI, stroke, HTN, cardiomyopathy (long term use), seizures, fredi, psychosis, and aggressive behaviors.

## 2025-04-01 NOTE — PROGRESS NOTES
"  Mental Health and Collaborative Care Psychiatry Service Rooming Note      Most pressing mental health concern at this time: Depression      Any new physical health conditions or diagnoses affecting you that we should be aware of: no      Side effects related to medications patient would like to discuss with the provider:  No      Are you taking your medications as prescribed?  yes  If not, why? N/A      Do you need refills of any of the medications?  no  If so, which ones? N/A      Are you taking any recreational substances? no      Answers submitted by the patient for this visit:  Patient Health Questionnaire (Submitted on 4/1/2025)  If you checked off any problems, how difficult have these problems made it for you to do your work, take care of things at home, or get along with other people?: Not difficult at all  PHQ9 TOTAL SCORE: 3          Care team has reviewed attendance agreement with patient. Patient advised that two failed appointments within 6 months may lead to termination of current episode of care.       **If appointment is with Transition Clinic-include the following:      \"The Transition Clinic is a temporary psychiatry service that helps to bridge the time to your next appointment. It is not intended to be a long-term service and you are expected to attend your scheduled appointment with your next provider.\"    [ x] Patient/Parent verbalized understanding     If you need support between appointments, please call [phone] and let them know you're seen by [facility] Psychiatry. You may also send a [employer] message to reach us.     New (awaiting) Mental health provider or next programming: [facility]  Date of scheduled apt: DANIELLE Machado MA  April 1, 2025  12:11 PM       "

## 2025-04-01 NOTE — PATIENT INSTRUCTIONS
Continue:  Hydroxyzine/Atarax 25 mg nightly as needed for sleep/insomnia     ------------------------    -If there are questions/inquiries between now and the upcoming follow up appointment OR prior to transferring to the next level of care, please call or message the [facility] Psychiatry Department.    Clinic hours/phone number:   -Monday to Friday from 8:00 am to 4:30 pm  -[phone]      MyChart:  -IS NOT an emergency messaging service  -Should not be considered equivalent to text messages   -Please allow up to 3 business days for a reply   -If you do not receive a response within 3 business days, please do not hesitate to contact us again via calling (see above) or messaging to check on the status of your questions or concerns    -----------------------    Call:  -[phone] ([phone]) if guidance is needed after [facility] clinic hours about utilizing [facility] versus the Emergency Departments    ----------------------    Any time (during or after regular clinic hours) immediate and or life threatening symptoms occur (either medical or mental health), call:  [phone] and or go to the nearest Emergency Department      ---------------------    Please use the following websites to obtain a comprehensive list of all counties within the state [facility] for adult and child mental health crisis numbers:    https://[place].gov/dhs/people-we-serve/people-with-disabilities/health-care/adult-mental-health/resources/crisis-contacts.jsp    https://[place]/dhs/people-we-serve/people-with-disabilities/health-care/childrens-mental-health/resources/crisis-contacts.jsp      -------------------      Below is a list of county (also found on the above websites), state, and national crisis numbers.   These resources can provide real-time assistance if experiencing moderate to severe mental health symptoms.        Additionally, please visit your county website to find the most up-to-date list of local:  adult, child, family, and  chemical dependency services available.        [facility]  [phone]    [facility]  [phone]    [facility]  [phone]    [facility]  [phone]    [facility]  [phone]: Adults 18 and over    [phone]: Children 17 and under    [facility] ([facility]), Acute Psychiatric Services (APS) Assessment & Referral:   [phone]    Community Outreach for Psychiatric Emergencies (COPE):  [phone]    [facility]  [phone]: Adults 18 and over    [phone]: Children 17 and under      Walk-in crisis services are available Monday to Friday from 8 am to 5:30 pm at Urgent Care for  Adult metal health:    [address] [place] [address]  Closed on Saturday, Sunday, and holidays    [place]  [phone]: Toll free    [facility]  [phone]      [facility] [place]  [phone]: Toll free    Text: [place] to 533165      [facility] and human services  Call 211 or visit https://www.211unitedway.org to obtain free and confidential h/hs information     [place] [facility]  If you are an adult needing support, you can talk to a specialist who has first hand experience living with a mental health condition:    [phone]    Text: Support to 62487    [facility] [place]:  domestic violence/LGBTQ+  715.776.1396 option 3    [employer]: LGBTQ+  6-546-202-0954    Text: START to 031617     Resources  8-161-KunhDal: (1-592.280.2823)    Crisis line: 1-775.236.1274    Text: 710745    [employer]: women and sexually exploited youth  [phone]    [employer] Domestic Violence Shelter Hotline  784.997.7114    National Suicide Prevention Lifeline  983    National Youth Crisis Hotline  678

## 2025-04-07 ENCOUNTER — NURSE TRIAGE (OUTPATIENT)
Dept: FAMILY MEDICINE | Facility: CLINIC | Age: 35
End: 2025-04-07
Payer: COMMERCIAL

## 2025-04-07 NOTE — TELEPHONE ENCOUNTER
"Reason for Disposition   Patient wants to be seen    Additional Information   Negative: Difficulty breathing   Negative: Depression is main problem or symptom (e.g., feelings of sadness or hopelessness)   Negative: Traumatic Brain Injury (TBI) is suspected   Negative: Suspected alcohol withdrawal or unhealthy use of alcohol (drinking too much)   Negative: Suspected substance use (drug use), addiction, or withdrawal   Negative: Pain is causing insomnia and pain is NOT a chronic symptom (recurrent or ongoing AND present > 4 weeks)   Negative: MODERATE - SEVERE insomnia (e.g., interferes with work or school)   Negative: Pain is causing insomnia and pain is a chronic symptom (recurrent or ongoing AND present > 4 weeks)   Negative: Insomnia lasts > 2 weeks and no improvement after using Care Advice    Answer Assessment - Initial Assessment Questions  1. DESCRIPTION: \"Tell me about your sleeping problem.\"       Pt says that he has not slept at all in the past 5 days.  Pt explains that he was prescribed hydroxyzine but stopped it a few days ago because it did not seem to have an impact.    Pt says he cannot get in thoughts to calm so that he can sleep.  Pt says he is eating and drinking regularly.   Pt says that he is laying in bed until noon or 1 pm to compensate for not sleeping in an attempt to fall asleep but it is not working either.  Pt is not working or attending at job at this time.  Pt wants to be seen in person to properly address his sleep problem.  Pt says that his psychiatrist told him to go to the ED if had not slept in 48 hours.    2. ONSET: \"How long have you been having trouble sleeping?\" (e.g., days, weeks, months)         3. RECURRENT: \"Have you had sleeping problems before?\"  If Yes, ask: \"What happened that time?\" \"What helped your sleeping problem go away in the past?\"          4. STRESS: \"Is there anything in your life that is making you feel stressed or tense?\"      Pt recently established care with " "a new psychiatrist. He says that he was stressed out about seeing this person on 4/1/25 and could not sleep.  5. PAIN: \"Do you have any pain that is keeping you awake?\" (e.g., back pain, headache, abdomen pain)      denies  6. CAFFEINE ABUSE: \"Do you drink caffeinated beverages, and how much each day?\" (e.g., coffee, tea, tarun)         7. ALCOHOL USE OR SUBSTANCE USE (DRUG USE): \"Do you drink alcohol or use any illegal drugs?\"         8. OTHER SYMPTOMS: \"Do you have any other symptoms?\"  (e.g., difficulty breathing)       Denies.    Protocols used: Insomnia-A-OH    "

## 2025-04-08 ENCOUNTER — HOSPITAL ENCOUNTER (EMERGENCY)
Facility: CLINIC | Age: 35
Discharge: HOME | End: 2025-04-08
Payer: COMMERCIAL

## 2025-04-08 VITALS
HEIGHT: 69 IN | TEMPERATURE: 98 F | BODY MASS INDEX: 23.7 KG/M2 | OXYGEN SATURATION: 100 % | RESPIRATION RATE: 17 BRPM | DIASTOLIC BLOOD PRESSURE: 84 MMHG | WEIGHT: 160 LBS | HEART RATE: 71 BPM | SYSTOLIC BLOOD PRESSURE: 149 MMHG

## 2025-04-08 DIAGNOSIS — G47.00 INSOMNIA, UNSPECIFIED TYPE: ICD-10-CM

## 2025-04-08 PROCEDURE — 99284 EMERGENCY DEPT VISIT MOD MDM: CPT | Mod: 25 | Performed by: EMERGENCY MEDICINE

## 2025-04-08 PROCEDURE — 258N000003 HC RX IP 258 OP 636

## 2025-04-08 PROCEDURE — 96374 THER/PROPH/DIAG INJ IV PUSH: CPT | Performed by: EMERGENCY MEDICINE

## 2025-04-08 PROCEDURE — 250N000011 HC RX IP 250 OP 636: Mod: JZ

## 2025-04-08 PROCEDURE — 96375 TX/PRO/DX INJ NEW DRUG ADDON: CPT | Performed by: EMERGENCY MEDICINE

## 2025-04-08 PROCEDURE — 96361 HYDRATE IV INFUSION ADD-ON: CPT | Performed by: EMERGENCY MEDICINE

## 2025-04-08 PROCEDURE — 99284 EMERGENCY DEPT VISIT MOD MDM: CPT | Mod: FS | Performed by: EMERGENCY MEDICINE

## 2025-04-08 RX ORDER — KETOROLAC TROMETHAMINE 15 MG/ML
15 INJECTION, SOLUTION INTRAMUSCULAR; INTRAVENOUS ONCE
Status: COMPLETED | OUTPATIENT
Start: 2025-04-08 | End: 2025-04-08

## 2025-04-08 RX ORDER — OLANZAPINE 10 MG/1
5 INJECTION, POWDER, LYOPHILIZED, FOR SOLUTION INTRAMUSCULAR ONCE
Status: COMPLETED | OUTPATIENT
Start: 2025-04-08 | End: 2025-04-08

## 2025-04-08 RX ORDER — TRAZODONE HYDROCHLORIDE 50 MG/1
50 TABLET ORAL AT BEDTIME
Qty: 10 TABLET | Refills: 0 | Status: SHIPPED | OUTPATIENT
Start: 2025-04-08

## 2025-04-08 RX ADMIN — SODIUM CHLORIDE, SODIUM LACTATE, POTASSIUM CHLORIDE, AND CALCIUM CHLORIDE 1000 ML: .6; .31; .03; .02 INJECTION, SOLUTION INTRAVENOUS at 16:27

## 2025-04-08 RX ADMIN — OLANZAPINE 5 MG: 10 INJECTION, POWDER, FOR SOLUTION INTRAMUSCULAR at 16:27

## 2025-04-08 RX ADMIN — KETOROLAC TROMETHAMINE 15 MG: 15 INJECTION, SOLUTION INTRAMUSCULAR; INTRAVENOUS at 16:26

## 2025-04-08 ASSESSMENT — ACTIVITIES OF DAILY LIVING (ADL)
ADLS_ACUITY_SCORE: 41
ADLS_ACUITY_SCORE: 41

## 2025-04-08 NOTE — ED TRIAGE NOTES
"Patient presents with complaints of insomnia and a \"tension headahce\" since 3/30/25. Patient stated that he is \"pretty sure\" he has not slept at all since the 30th or 31st of last month. Patient has hydroxyzine for sleep but says that it has not been working.     Triage Assessment (Adult)       Row Name 04/08/25 1535          Triage Assessment    Airway WDL WDL        Respiratory WDL    Respiratory WDL WDL        Skin Circulation/Temperature WDL    Skin Circulation/Temperature WDL WDL        Cardiac WDL    Cardiac WDL WDL        Peripheral/Neurovascular WDL    Peripheral Neurovascular WDL WDL        Cognitive/Neuro/Behavioral WDL    Cognitive/Neuro/Behavioral WDL WDL                     "

## 2025-04-08 NOTE — DISCHARGE INSTRUCTIONS
You can try taking Trazodone 50mg at bedtime for insomnia. This is safe to take with Hydroxyzine.     Please schedule a close follow up with your primary care provider. I also placed a new referral to mental health to see if you can get cognitive behavioral therapy for insomnia.     Return to the ER if you develop severe headache, vision changes, fever, vomiting, or if other concerning symptoms develop.

## 2025-04-08 NOTE — ED PROVIDER NOTES
History     Chief Complaint   Patient presents with    Insomnia       Berny Fowler is a 34 year old male with significant pmhx of insomnia, adjustment disorder with anxious mood who presents for evaluation of insomnia.  Patient states he has had difficulty sleeping for the last 7 days, and he is unsure if he has been able to get any true sleep since then.  He reports a history of periods of insomnia where he is unable to sleep for over 24 hours.  He was most recently seen in our ER in March for days of insomnia and was discharged with hydroxyzine.  He states he had trouble sleeping on the 31st because he had a behavioral health appointment in [place] on April 1.  Between his ER visit on 3/15 and appointment on 4/1 he states he had been sleeping better throughout the day with the hydroxyzine (he usually stays up most of the night and goes to bed around 5 AM, waking up between 1 and 3 PM).  However, since 4/1 the hydroxyzine has not been working.  He states he had been trying to take the hydroxyzine early in the night around 10 PM/11 PM, but was unable to fall asleep.  He has been lying in the bed with his eyes covered to get to sleep and has not had any success.  Since this period of insomnia started he is also developed what he describes as a tension headache across his forehead.  He denies associated vision changes, photophobia, sinus pressure, congestion, fever, chest pain, difficulty breathing, cough, abdominal pain.  He has been eating and drinking normally.  No history of diagnosed anxiety or depression.  He states he is working to establish care with a therapist, and has establish care with a primary care provider.  He has had thoughts previously of suicide when his insomnia and pain are severe, but he denies any suicidal or homicidal thoughts today.    Allergies:  No Known Allergies    Problem List:    Patient Active Problem List    Diagnosis Date Noted    Other insomnia 03/25/2025     Priority:  "Medium    Adjustment disorder with anxious mood 03/15/2025     Priority: Medium    Impacted cerumen 09/26/2012     Priority: Medium    Scabies 09/26/2012     Priority: Medium    Acne 08/04/2011     Priority: Medium    Flat feet 08/04/2011     Priority: Medium    CARDIOVASCULAR SCREENING; LDL GOAL LESS THAN 160 09/26/2012     Priority: Low        Past Medical History:    Past Medical History:   Diagnosis Date    Undescended testis        Past Surgical History:    Past Surgical History:   Procedure Laterality Date    NO HISTORY OF SURGERY         Family History:    Family History   Problem Relation Age of Onset    C.A.SHONA. Father         MI at age 42 with stent placement    Hypertension Father     Lipids Father     C.A.D. Maternal Grandfather     C.A.D. Paternal Grandfather     Allergies Sister     Cancer Paternal Grandmother         bone     Melanoma No family hx of        Social History:  Marital Status:  Single [1]  Social History     Tobacco Use    Smoking status: Never     Passive exposure: Never    Smokeless tobacco: Never   Vaping Use    Vaping status: Never Used   Substance Use Topics    Alcohol use: Yes     Comment: social     Drug use: No        Medications:    hydrOXYzine HCl (ATARAX) 25 MG tablet  traZODone (DESYREL) 50 MG tablet          Physical Exam   BP: (!) 146/100  Pulse: 117  Temp: 98  F (36.7  C)  Resp: 16  Height: 175.3 cm (5' 9\")  Weight: 72.6 kg (160 lb)  SpO2: 98 %      Physical Exam  Vitals and nursing note reviewed.   Constitutional:       General: He is not in acute distress.     Appearance: He is not ill-appearing, toxic-appearing or diaphoretic.   HENT:      Head: Normocephalic and atraumatic.      Mouth/Throat:      Mouth: Mucous membranes are moist.      Pharynx: Oropharynx is clear. No oropharyngeal exudate or posterior oropharyngeal erythema.   Eyes:      Extraocular Movements: Extraocular movements intact.      Conjunctiva/sclera: Conjunctivae normal.      Pupils: Pupils are equal, " round, and reactive to light.   Cardiovascular:      Rate and Rhythm: Regular rhythm. Tachycardia present.      Heart sounds: Normal heart sounds.   Pulmonary:      Effort: Pulmonary effort is normal.      Breath sounds: Normal breath sounds. No wheezing, rhonchi or rales.   Musculoskeletal:         General: Normal range of motion.      Cervical back: Normal range of motion.   Neurological:      General: No focal deficit present.      Mental Status: He is alert and oriented to person, place, and time.   Psychiatric:         Mood and Affect: Mood normal.         Behavior: Behavior normal.         Thought Content: Thought content does not include homicidal or suicidal ideation.         ED Course        Procedures                    No results found for this or any previous visit (from the past 24 hours).    Medications   lactated ringers BOLUS 1,000 mL (1,000 mLs Intravenous $New Bag 4/8/25 1627)   OLANZapine (zyPREXA) IV injection 5 mg (5 mg Intravenous $Given 4/8/25 1627)   ketorolac (TORADOL) injection 15 mg (15 mg Intravenous $Given 4/8/25 1626)       Assessments & Plan (with Medical Decision Making)     I have reviewed the nursing notes.    I have reviewed the findings, diagnosis, plan and need for follow up with the patient.    Medical Decision Making  Beryn Fowler is a 34 year old male with significant pmhx of insomnia, adjustment disorder with anxious mood who presents for evaluation of insomnia.     Per chart review, patient was seen twice in our emergency department on 3/15/2025 for complaints of insomnia, neck and knee pain, and suicidal ideation.  He had a DEC assessment because he was feeling suicidal due to his insomnia and pain.  He was ultimately discharged with hydroxyzine for sleep and has been following up with behavioral health.  At his most recent visit with behavioral health on 4/1/2025 he reported improvement in his sleep length with hydroxyzine.  He predominantly sleeps during the day and  stays up all night.  In reviewing his chart, there are many visits for insomnia dating back to 2023.  No formal mental health diagnoses.    On examination patient is awake, alert, no acute distress.  He is answering questions appropriately.  He is quiet and does not make a lot of direct eye contact.  Eyes PERRLA, EOMs intact.  Cranial nerves are grossly intact.  Lungs clear to auscultation throughout, heart sounds are normal.  Patient denied concerning symptoms such as vision changes, fever, thunderclap headache, neurologic changes, or other red flags warranting emergent head imaging for evaluation of his headache.  He denied other systemic symptoms concerning for other underlying medical disorder that would warrant blood work or other imaging.    Will treat the patient's headache with IV Zyprexa, Toradol, and fluids.  Considered multiple different medications for treatment of short-term insomnia including benzodiazepines, nonbenzodiazepines, and others but I am not familiar with these medications I do not prescribe them regularly, due to concerns for abuse potential and side effects I am reluctant to prescribe these from the emergency department today.  I suspect his insomnia could be due to underlying mental health disorder, however he has never received a formal diagnosis.  He was unable to identify any clear triggers or stressors in the past month that could have led to this current episode.  He does recall feeling stressed prior to his behavioral health visit on 4/1 and that was the night he started having difficulty sleeping.  Seems that since that visit he has tried to start going to bed earlier in the night instead of early in the morning, and wonder if this sudden change in habit could be affecting his sleep as well.  Will try a short course of trazodone for sleep, in addition to hydroxyzine.  We discussed proper sleep hygiene.  Emergent referral placed to primary care for follow-up, and referral placed to  mental health to discuss option of cognitive behavioral therapy.  Also recommend you continue to schedule follow-up with a talk therapist as planned.    Patient was signed out to Dr. Leyva pending reevaluation after medications and fluids.  If he is feeling improved, will continue with plan as listed above.      New Prescriptions    TRAZODONE (DESYREL) 50 MG TABLET    Take 1 tablet (50 mg) by mouth at bedtime.       Final diagnoses:   Insomnia, unspecified type       Julia Hastings PA-C  April 8, 2025  [facility] EMERGENCY DEPT     Julia Hastings PA-C  04/08/25 1694     Detailed exam

## 2025-04-09 ENCOUNTER — PATIENT OUTREACH (OUTPATIENT)
Dept: CARE COORDINATION | Facility: CLINIC | Age: 35
End: 2025-04-09
Payer: COMMERCIAL

## 2025-04-09 NOTE — PROGRESS NOTES
Clinic Care Coordination Contact  Community Health Worker Initial Outreach    CHW Initial Information Gathering:  Referral Source: ED Follow-Up  Current living arrangement:: Not Assessed  No PCP office visit in Past Year: No  CHW Additional Questions  If ED/Hospital discharge, follow-up appointment scheduled as recommended?: N/A  Medication changes made following ED/Hospital discharge?: N/A  MyChart active?: Yes  Patient sent Social Drivers of Health questionnaire?: No    Patient accepts CC: No, Patient expressed no additional support is needed at this time. Patient will be sent Care Coordination introduction letter for future reference.     Guadalupe Vivas  Community Health Worker    Connected Care Resources   [facility]     *Connected Care Resources Team does NOT   follow patient ongoing. Referrals are identified   based on internal discharge report and the outreach is to ensure   Patient has understanding of their discharge instructions.

## 2025-04-09 NOTE — LETTER
Berny Fowler  [address]  [place] [address]    Dear Berny Fowler,      I am a team member within the Connected Care Resource Center with [facility]. I recently tried to reach you to ensure you were doing well following a recent visit within our health system. I also wanted to take this chance to introduce Clinic Care Coordination.     Below is a description of Clinic Care Coordination and how this team can further assist you:       The Clinic Care Coordination team is made up of a Registered Nurse, , Financial Resource Worker, and a Community Health Worker who understand and can help navigate the health care system. The goal of clinic care coordination is to help you manage your health, improve access to care, and achieve optimal health outcomes. They work alongside your provider to assist you in determining your health and social needs, obtain health care and community resources, and provide you with necessary information and education. Clinic Care Coordination can work with you through any barriers and develop a care plan that helps coordinate and strengthen the relationship between you and your care team.    If you wish to connect with the Clinic Care Coordination Team, please let your [facility] Primary Care Provider or Clinic Care Team know and they can place a referral. The Clinic Care Coordination team will then reach out by phone to further support you.    We are focused on providing you with the highest-quality healthcare experience possible.    Sincerely,   Guadalupe SIM  Community Health Worker    Connected Care Resources   [facility] [address] [place] [address] ([phone]).

## 2025-04-17 ENCOUNTER — OFFICE VISIT (OUTPATIENT)
Dept: PODIATRY | Facility: CLINIC | Age: 35
End: 2025-04-17
Attending: FAMILY MEDICINE
Payer: COMMERCIAL

## 2025-04-17 VITALS — HEIGHT: 69 IN | WEIGHT: 160 LBS | BODY MASS INDEX: 23.7 KG/M2

## 2025-04-17 DIAGNOSIS — M21.40 ACQUIRED PES PLANOVALGUS, UNSPECIFIED LATERALITY: ICD-10-CM

## 2025-04-17 DIAGNOSIS — M21.42 FLAT FEET: ICD-10-CM

## 2025-04-17 DIAGNOSIS — M76.821 POSTERIOR TIBIAL TENDON DYSFUNCTION (PTTD) OF BOTH LOWER EXTREMITIES: Primary | ICD-10-CM

## 2025-04-17 DIAGNOSIS — M76.822 POSTERIOR TIBIAL TENDON DYSFUNCTION (PTTD) OF BOTH LOWER EXTREMITIES: Primary | ICD-10-CM

## 2025-04-17 DIAGNOSIS — M21.41 FLAT FEET: ICD-10-CM

## 2025-04-17 ASSESSMENT — PAIN SCALES - GENERAL: PAINLEVEL_OUTOF10: SEVERE PAIN (7)

## 2025-04-17 NOTE — PATIENT INSTRUCTIONS

## 2025-04-17 NOTE — NURSING NOTE
"Chief Complaint   Patient presents with    Consult     Flat feet, would like new orthotics- other pair is about 10-15 years old       Initial Ht 1.753 m (5' 9\")   Wt 72.6 kg (160 lb)   BMI 23.63 kg/m   Estimated body mass index is 23.63 kg/m  as calculated from the following:    Height as of this encounter: 1.753 m (5' 9\").    Weight as of this encounter: 72.6 kg (160 lb).  Medications and allergies reviewed.      Vangie NAGEL MA    "

## 2025-04-17 NOTE — LETTER
4/17/2025      Berny Fowler  [address]  [place] [address]      Dear Colleague,    Thank you for referring your patient, Berny Fowler, to the [facility]. Please see a copy of my visit note below.    PATIENT HISTORY:  Berny Fowler is a 34 year old male who presents to clinic in consultation at the request of  Jeremy Daniels M.D. with a chief complaint of flat feet.  The patient is seen by themselves.  The patient relates the pain is primarily located around the bottom of the arches.  Patient would like new order for orthotics.  The patient relates that the symptoms have been going on for several year(s).  The patient has previously tried different shoes, and orthotics with little relief.  The patient is unemployed.  Any previous notes and studies that pertain to the patient's condition were reviewed.    Pertinent medical, surgical and family history was reviewed in the Epic chart.    Past Medical History:   Past Medical History:   Diagnosis Date     Undescended testis     CAME DOWN WITHOUT SURGERY       Medications:   Current Outpatient Medications:      hydrOXYzine HCl (ATARAX) 25 MG tablet, Take 1 tablet (25 mg) by mouth nightly as needed for other (insomnia)., Disp: 60 tablet, Rfl: 1     traZODone (DESYREL) 50 MG tablet, Take 1 tablet (50 mg) by mouth at bedtime., Disp: 10 tablet, Rfl: 0     Allergies:  No Known Allergies      LOWER EXTREMITY PHYSICAL EXAM    Dermatologic: Skin is intact to right and left lower extremity without significant lesions, rash or abrasion.        Vascular: DP & PT pulses are intact & regular on the right and left.   CFT and skin temperature is normal to the right and left lower extremity.     Neurologic: Lower extremity sensation is intact to light touch.  No evidence of weakness in the right and left lower extremity.        Musculoskeletal: Patient is ambulatory without assistive device or brace.  No gross ankle  deformity noted.  No foot or ankle joint effusion is noted.  Noted collapse of the medial longitudinal arch with forefoot abduction bilaterally upon weight bearing exam.  Noted positive Guero's test bilaterally.  Noted tight gastroc complex bilaterally.              ASSESSMENT / PLAN:     ICD-10-CM    1. Posterior tibial tendon dysfunction (PTTD) of both lower extremities  M76.821 Orthotics, Mastectomy and Custom Compression Orders Type: Orthotic; Orthotic Type Requested: Foot Orthotics (functional rigid support); Foot Orthotics Laterality: Bilateral    M76.822       2. Flat feet  M21.41 Orthopedic  Referral    M21.42 Orthotics, Mastectomy and Custom Compression Orders Type: Orthotic; Orthotic Type Requested: Foot Orthotics (functional rigid support); Foot Orthotics Laterality: Bilateral      3. Acquired pes planovalgus, unspecified laterality  M21.40           I have explained to Berny about the conditions.  We discussed the underlying contributing factors to the condition as well as both conservative and surgical treatment options along with expected length of recovery.  At this time, the patient was educated on the importance of offloading supportive shoes and other devices.  I demonstrated to the patient calf stretches to perform every hour daily until symptoms resolve.  After symptoms resolve, the patient was advised to perform the stretches 3 times daily to prevent future recurrence.  The patient was instructed to perform warm soaks with Epson salt after which to also apply over-the-counter Voltaren gel to deeply massage the injured tissue.  The patient was instructed to do this on a daily basis until symptoms resolve.  The patient was prescribed custom orthotics that will aid in offloading the tension forces to the soft tissues and prevent further inflammation.   The patient may return in four weeks for reevaluation to determine if any further treatment will be needed.      Berny verbalized  agreement with and understanding of the rational for the diagnosis and treatment plan.  All questions were answered to best of my ability and the patient's satisfaction. The patient was advised to contact the clinic with any questions that may arise after the clinic visit.      Disclaimer: This note consists of symbols derived from keyboarding, dictation and/or voice recognition software. As a result, there may be errors in the script that have gone undetected. Please consider this when interpreting information found in this chart.       SHONA Thomas.P.M., F.A.C.F.A.S.      Again, thank you for allowing me to participate in the care of your patient.        Sincerely,        Jeremy Catherine DPM    Electronically signed

## 2025-04-17 NOTE — PROGRESS NOTES
PATIENT HISTORY:  Berny Fowler is a 34 year old male who presents to clinic in consultation at the request of  Jeremy Daniels M.D. with a chief complaint of flat feet.  The patient is seen by themselves.  The patient relates the pain is primarily located around the bottom of the arches.  Patient would like new order for orthotics.  The patient relates that the symptoms have been going on for several year(s).  The patient has previously tried different shoes, and orthotics with little relief.  The patient is unemployed.  Any previous notes and studies that pertain to the patient's condition were reviewed.    Pertinent medical, surgical and family history was reviewed in the [facility] chart.    Past Medical History:   Past Medical History:   Diagnosis Date    Undescended testis     CAME DOWN WITHOUT SURGERY       Medications:   Current Outpatient Medications:     hydrOXYzine HCl (ATARAX) 25 MG tablet, Take 1 tablet (25 mg) by mouth nightly as needed for other (insomnia)., Disp: 60 tablet, Rfl: 1    traZODone (DESYREL) 50 MG tablet, Take 1 tablet (50 mg) by mouth at bedtime., Disp: 10 tablet, Rfl: 0     Allergies:  No Known Allergies      LOWER EXTREMITY PHYSICAL EXAM    Dermatologic: Skin is intact to right and left lower extremity without significant lesions, rash or abrasion.        Vascular: DP & PT pulses are intact & regular on the right and left.   CFT and skin temperature is normal to the right and left lower extremity.     Neurologic: Lower extremity sensation is intact to light touch.  No evidence of weakness in the right and left lower extremity.        Musculoskeletal: Patient is ambulatory without assistive device or brace.  No gross ankle deformity noted.  No foot or ankle joint effusion is noted.  Noted collapse of the medial longitudinal arch with forefoot abduction bilaterally upon weight bearing exam.  Noted positive Jack's test bilaterally.  Noted tight gastroc complex bilaterally.               ASSESSMENT / PLAN:     ICD-10-CM    1. Posterior tibial tendon dysfunction (PTTD) of both lower extremities  M76.821 Orthotics, Mastectomy and Custom Compression Orders Type: Orthotic; Orthotic Type Requested: Foot Orthotics (functional rigid support); Foot Orthotics Laterality: Bilateral    M76.822       2. Flat feet  M21.41 Orthopedic  Referral    M21.42 Orthotics, Mastectomy and Custom Compression Orders Type: Orthotic; Orthotic Type Requested: Foot Orthotics (functional rigid support); Foot Orthotics Laterality: Bilateral      3. Acquired pes planovalgus, unspecified laterality  M21.40           I have explained to Berny about the conditions.  We discussed the underlying contributing factors to the condition as well as both conservative and surgical treatment options along with expected length of recovery.  At this time, the patient was educated on the importance of offloading supportive shoes and other devices.  I demonstrated to the patient calf stretches to perform every hour daily until symptoms resolve.  After symptoms resolve, the patient was advised to perform the stretches 3 times daily to prevent future recurrence.  The patient was instructed to perform warm soaks with Epson salt after which to also apply over-the-counter Voltaren gel to deeply massage the injured tissue.  The patient was instructed to do this on a daily basis until symptoms resolve.  The patient was prescribed custom orthotics that will aid in offloading the tension forces to the soft tissues and prevent further inflammation.   The patient may return in four weeks for reevaluation to determine if any further treatment will be needed.      Berny verbalized agreement with and understanding of the rational for the diagnosis and treatment plan.  All questions were answered to best of my ability and the patient's satisfaction. The patient was advised to contact the clinic with any questions that may arise after the clinic  visit.      Disclaimer: This note consists of symbols derived from keyboarding, dictation and/or voice recognition software. As a result, there may be errors in the script that have gone undetected. Please consider this when interpreting information found in this chart.       AUDIE Catherine D.P.M., STANLEY.[email or web address].

## 2025-05-30 ENCOUNTER — HOSPITAL ENCOUNTER (EMERGENCY)
Facility: CLINIC | Age: 35
Discharge: HOME | End: 2025-05-30
Attending: NURSE PRACTITIONER | Admitting: NURSE PRACTITIONER
Payer: COMMERCIAL

## 2025-05-30 VITALS
HEART RATE: 82 BPM | DIASTOLIC BLOOD PRESSURE: 93 MMHG | TEMPERATURE: 98.4 F | RESPIRATION RATE: 16 BRPM | SYSTOLIC BLOOD PRESSURE: 131 MMHG | OXYGEN SATURATION: 99 %

## 2025-05-30 DIAGNOSIS — H10.32 ACUTE BACTERIAL CONJUNCTIVITIS OF LEFT EYE: ICD-10-CM

## 2025-05-30 PROCEDURE — 99213 OFFICE O/P EST LOW 20 MIN: CPT | Performed by: NURSE PRACTITIONER

## 2025-05-30 PROCEDURE — G0463 HOSPITAL OUTPT CLINIC VISIT: HCPCS | Performed by: NURSE PRACTITIONER

## 2025-05-30 RX ORDER — OFLOXACIN 3 MG/ML
1-2 SOLUTION/ DROPS OPHTHALMIC 4 TIMES DAILY
Qty: 5 ML | Refills: 0 | Status: SHIPPED | OUTPATIENT
Start: 2025-05-30 | End: 2025-06-06

## 2025-05-30 ASSESSMENT — COLUMBIA-SUICIDE SEVERITY RATING SCALE - C-SSRS
6. HAVE YOU EVER DONE ANYTHING, STARTED TO DO ANYTHING, OR PREPARED TO DO ANYTHING TO END YOUR LIFE?: NO
2. HAVE YOU ACTUALLY HAD ANY THOUGHTS OF KILLING YOURSELF IN THE PAST MONTH?: NO
1. IN THE PAST MONTH, HAVE YOU WISHED YOU WERE DEAD OR WISHED YOU COULD GO TO SLEEP AND NOT WAKE UP?: NO

## 2025-05-30 NOTE — ED PROVIDER NOTES
ED Provider Note  [facility]      History   No chief complaint on file.    HPI  Berny Fowler is a 35 year old male who reports that he awakened today with drainage /matting of his left eye, eye irritation as he has been rubbing his eye.  Has had some mild discomfort with opening his eye fully photophobia began a couple hours ago.  Patient denies any fever, chills, nasal congestion, ear pain, nausea, vomiting, diarrhea.  Patient does have a history of seasonal allergies but is currently not taking anything for this.  Past medical history of adjustment disorder, seasonal allergies.            Allergies:  No Known Allergies    Problem List:    Patient Active Problem List    Diagnosis Date Noted    Other insomnia 03/25/2025     Priority: Medium    Adjustment disorder with anxious mood 03/15/2025     Priority: Medium    Impacted cerumen 09/26/2012     Priority: Medium    Scabies 09/26/2012     Priority: Medium    Acne 08/04/2011     Priority: Medium    Flat feet 08/04/2011     Priority: Medium    CARDIOVASCULAR SCREENING; LDL GOAL LESS THAN 160 09/26/2012     Priority: Low        Past Medical History:    Past Medical History:   Diagnosis Date    Undescended testis        Past Surgical History:    Past Surgical History:   Procedure Laterality Date    NO HISTORY OF SURGERY         Social History:  Marital Status:  Single [1]  Social History     Tobacco Use    Smoking status: Never     Passive exposure: Never    Smokeless tobacco: Never   Vaping Use    Vaping status: Never Used   Substance Use Topics    Alcohol use: Yes     Comment: social     Drug use: No        Medications:    hydrOXYzine HCl (ATARAX) 25 MG tablet  traZODone (DESYREL) 50 MG tablet          Review of Systems  A medically appropriate review of systems was performed with pertinent positives and negatives noted in the HPI, and all other systems negative.    Physical Exam   Patient Vitals for the past 24 hrs:   BP Temp Temp src  Pulse Resp SpO2   05/30/25 1622 (!) 131/93 -- -- -- -- --   05/30/25 1620 -- -- -- 82 -- --   05/30/25 1610 (!) 151/110 98.4  F (36.9  C) Tympanic 118 16 99 %          Physical Exam  General: alert and in no acute distress on arrival  Head: atraumatic, normocephalic, Eyes:  non-traumatic.  Left Eye: reddened conjunctiva, no icterus, noninjected, normal pupillary response to light, purulent drainage present. Normal extraocular movement.  Right eye: Non-reddened conjunctiva, no icterus, noninjected, normal pupillary response to light. Normal extraocular movement bilateral. No drainage present. ENT: Left Ear: TM intact, middle ear is not erythremic, no purulence, canal patent. Right Ear: TM intact, middle ear is not erythremic, no purulence, canal patent. Nose: No erythema or edema patent nostrils bilateral. Throat: midline uvula, non-erythremic, non-enlarged tonsils, without exudate.  No cervical adenopathy.  Lungs:  nonlabored, CTA bilateral throughout  CV: Regular rate, extremities warm and perfused  Skin: no rashes, no diaphoresis and skin color normal  Neuro: Patient awake, alert, speech is fluent, no focal deficits  Psychiatric: affect/mood normal, appropriate historian      ED Course                 Procedures                    No results found for this or any previous visit (from the past 48 hours).    MEDICATIONS GIVEN IN THE EMERGENCY DEPARTMENT:  Medications - No data to display             Assessments & Plan (with Medical Decision Making)  35 year old male who presents to the Urgent Care for evaluation of left eye irritation, patient reports that he awakened this morning with purulent drainage matting of his left eye reports that he had been rubbing his eye due to irritation.  Has some mild photophobia of symptoms.  Exam is consistent with acute conjunctivitis likely bacterial conjunctivitis as drainage is purulent.  No foreign objects were seen in the eye.  Other exam findings are within normal  limits.  Diagnosis acute conjunctivitis of left eye  Treatment plan: Ofloxacin ordered 4 times daily for 7 days recommend follow-up in ophthalmology provider clinic if symptoms are not improving despite recommended treatment plan.  Patient discharged home in stable condition.      Patient verbalized agreement with and understanding of the rational for the diagnosis and treatment plan.  All questions were answered to best of my ability and the patient's satisfaction. The patient was advised to contact or return to their primary clinic or UC/ED with any questions that may arise after this visit.       I have reviewed the nursing notes.    I have reviewed the findings, diagnosis, plan and need for follow up with the patient.        NEW PRESCRIPTIONS STARTED AT TODAY'S ER VISIT  Discharge Medication List as of 5/30/2025  4:34 PM        START taking these medications    Details   ofloxacin (OCUFLOX) 0.3 % ophthalmic solution Place 1-2 drops into both eyes 4 times daily for 7 days., Disp-5 mL, R-0, E-Prescribe             Final diagnoses:   Acute bacterial conjunctivitis of left eye       5/30/2025   [facility] EMERGENCY DEPT    Disclaimer: This note consists of symbols derived from keyboarding, dictation, and/or voice recognition software. As a result, there may be errors in the script that have gone undetected.  Please consider this when interpreting information found in the chart.      Shelly Caldwell, KALINA CNP  06/02/25 [id number]

## 2025-05-30 NOTE — DISCHARGE INSTRUCTIONS
Ofloxacin drops are ordered 4 times daily for 7 days for treatment of conjunctivitis which is a bacterial infection if there is not a significant improvement in the next several days this may be a source of viral conjunctivitis, if this is a viral source of conjunctivitis recommend warm packing the eye recommend not wearing contact lenses until this is completely cleared up.  Follow-up with eye provider if symptoms are not improving despite recommended treatment plan.